# Patient Record
Sex: FEMALE | Race: WHITE | NOT HISPANIC OR LATINO | Employment: FULL TIME | URBAN - METROPOLITAN AREA
[De-identification: names, ages, dates, MRNs, and addresses within clinical notes are randomized per-mention and may not be internally consistent; named-entity substitution may affect disease eponyms.]

---

## 2023-04-27 PROBLEM — K21.9 GERD (GASTROESOPHAGEAL REFLUX DISEASE): Status: ACTIVE | Noted: 2023-04-27

## 2023-04-27 PROBLEM — F32.A DEPRESSION: Status: ACTIVE | Noted: 2023-04-27

## 2023-04-27 PROBLEM — M99.05 SOMATIC DYSFUNCTION OF PELVIC REGION: Status: ACTIVE | Noted: 2023-04-27

## 2023-04-27 PROBLEM — R13.10 DYSPHAGIA: Status: ACTIVE | Noted: 2023-04-27

## 2023-04-27 PROBLEM — F98.8 ATTENTION DEFICIT DISORDER (ADD): Status: ACTIVE | Noted: 2023-04-27

## 2023-04-27 PROBLEM — F31.9 BIPOLAR MOOD DISORDER (MULTI): Status: ACTIVE | Noted: 2023-04-27

## 2023-04-27 PROBLEM — R92.8 ABNORMAL MAMMOGRAM OF LEFT BREAST: Status: ACTIVE | Noted: 2023-04-27

## 2023-04-27 PROBLEM — F41.9 ANXIETY: Status: ACTIVE | Noted: 2023-04-27

## 2023-04-27 PROBLEM — M99.06 SOMATIC DYSFUNCTION OF LOWER EXTREMITIES: Status: ACTIVE | Noted: 2023-04-27

## 2023-04-27 PROBLEM — R73.09 ABNORMAL GLUCOSE: Status: ACTIVE | Noted: 2023-04-27

## 2023-04-27 PROBLEM — K59.00 CONSTIPATION: Status: ACTIVE | Noted: 2023-04-27

## 2023-04-27 PROBLEM — M99.03 SOMATIC DYSFUNCTION OF LUMBAR REGION: Status: ACTIVE | Noted: 2023-04-27

## 2023-04-27 PROBLEM — N12 PYELONEPHRITIS: Status: ACTIVE | Noted: 2023-04-27

## 2023-04-27 PROBLEM — D64.9 ANEMIA: Status: ACTIVE | Noted: 2023-04-27

## 2023-04-27 PROBLEM — H02.729 EYELASH SCANTINESS: Status: ACTIVE | Noted: 2023-04-27

## 2023-04-27 PROBLEM — M99.04 SOMATIC DYSFUNCTION OF SACRAL REGION: Status: ACTIVE | Noted: 2023-04-27

## 2023-04-27 PROBLEM — I10 BENIGN ESSENTIAL HYPERTENSION: Status: ACTIVE | Noted: 2023-04-27

## 2023-04-27 PROBLEM — R40.0 DAYTIME SLEEPINESS: Status: ACTIVE | Noted: 2023-04-27

## 2023-05-30 ENCOUNTER — OFFICE VISIT (OUTPATIENT)
Dept: PRIMARY CARE | Facility: CLINIC | Age: 68
End: 2023-05-30
Payer: MEDICARE

## 2023-05-30 VITALS
DIASTOLIC BLOOD PRESSURE: 70 MMHG | OXYGEN SATURATION: 95 % | HEIGHT: 61 IN | HEART RATE: 65 BPM | BODY MASS INDEX: 34.93 KG/M2 | SYSTOLIC BLOOD PRESSURE: 110 MMHG | WEIGHT: 185 LBS

## 2023-05-30 DIAGNOSIS — Z78.0 MENOPAUSE: ICD-10-CM

## 2023-05-30 DIAGNOSIS — Z79.899 HIGH RISK MEDICATION USE: ICD-10-CM

## 2023-05-30 DIAGNOSIS — M17.0 PRIMARY OSTEOARTHRITIS OF BOTH KNEES: ICD-10-CM

## 2023-05-30 DIAGNOSIS — F98.8 ATTENTION DEFICIT DISORDER (ADD) WITHOUT HYPERACTIVITY: ICD-10-CM

## 2023-05-30 DIAGNOSIS — F31.75 BIPOLAR DISORDER, IN PARTIAL REMISSION, MOST RECENT EPISODE DEPRESSED (MULTI): ICD-10-CM

## 2023-05-30 DIAGNOSIS — I10 BENIGN ESSENTIAL HYPERTENSION: ICD-10-CM

## 2023-05-30 DIAGNOSIS — E55.9 VITAMIN D INSUFFICIENCY: ICD-10-CM

## 2023-05-30 DIAGNOSIS — Z00.00 MEDICARE ANNUAL WELLNESS VISIT, SUBSEQUENT: Primary | ICD-10-CM

## 2023-05-30 DIAGNOSIS — K21.9 GASTROESOPHAGEAL REFLUX DISEASE WITHOUT ESOPHAGITIS: ICD-10-CM

## 2023-05-30 DIAGNOSIS — Z00.00 ROUTINE GENERAL MEDICAL EXAMINATION AT HEALTH CARE FACILITY: ICD-10-CM

## 2023-05-30 DIAGNOSIS — I10 PRIMARY HYPERTENSION: ICD-10-CM

## 2023-05-30 PROBLEM — R06.83 SNORING: Status: ACTIVE | Noted: 2023-05-30

## 2023-05-30 PROBLEM — M65.30 TRIGGER FINGER: Status: ACTIVE | Noted: 2023-05-30

## 2023-05-30 PROBLEM — Z86.79 HX OF PRIMARY HYPERTENSION: Status: ACTIVE | Noted: 2023-05-30

## 2023-05-30 PROBLEM — G47.33 OBSTRUCTIVE SLEEP APNEA: Status: ACTIVE | Noted: 2023-05-30

## 2023-05-30 PROBLEM — R31.9 HEMATURIA: Status: ACTIVE | Noted: 2023-05-30

## 2023-05-30 PROBLEM — M19.90 ARTHRITIS: Status: ACTIVE | Noted: 2023-05-30

## 2023-05-30 PROBLEM — M17.9 OSTEOARTHRITIS OF KNEE: Status: ACTIVE | Noted: 2023-05-30

## 2023-05-30 PROBLEM — E78.5 ELEVATED FASTING LIPID PROFILE: Status: ACTIVE | Noted: 2023-05-30

## 2023-05-30 PROBLEM — H90.3 ASYMMETRIC SNHL (SENSORINEURAL HEARING LOSS): Status: ACTIVE | Noted: 2023-05-30

## 2023-05-30 PROBLEM — L90.0 LICHEN SCLEROSUS: Status: ACTIVE | Noted: 2023-05-30

## 2023-05-30 PROBLEM — M25.559 HIP PAIN: Status: ACTIVE | Noted: 2023-05-30

## 2023-05-30 PROBLEM — J31.0 CHRONIC RHINITIS: Status: ACTIVE | Noted: 2023-05-30

## 2023-05-30 PROBLEM — E78.5 HYPERLIPEMIA: Status: ACTIVE | Noted: 2023-05-30

## 2023-05-30 PROCEDURE — 1160F RVW MEDS BY RX/DR IN RCRD: CPT | Performed by: FAMILY MEDICINE

## 2023-05-30 PROCEDURE — 99214 OFFICE O/P EST MOD 30 MIN: CPT | Performed by: FAMILY MEDICINE

## 2023-05-30 PROCEDURE — 1036F TOBACCO NON-USER: CPT | Performed by: FAMILY MEDICINE

## 2023-05-30 PROCEDURE — 1170F FXNL STATUS ASSESSED: CPT | Performed by: FAMILY MEDICINE

## 2023-05-30 PROCEDURE — G0439 PPPS, SUBSEQ VISIT: HCPCS | Performed by: FAMILY MEDICINE

## 2023-05-30 PROCEDURE — 1159F MED LIST DOCD IN RCRD: CPT | Performed by: FAMILY MEDICINE

## 2023-05-30 PROCEDURE — 3078F DIAST BP <80 MM HG: CPT | Performed by: FAMILY MEDICINE

## 2023-05-30 PROCEDURE — 3074F SYST BP LT 130 MM HG: CPT | Performed by: FAMILY MEDICINE

## 2023-05-30 RX ORDER — MELOXICAM 15 MG/1
15 TABLET ORAL DAILY
Qty: 90 TABLET | Refills: 1 | Status: SHIPPED | OUTPATIENT
Start: 2023-05-30 | End: 2023-05-30 | Stop reason: SDUPTHER

## 2023-05-30 RX ORDER — HYDROCHLOROTHIAZIDE 25 MG/1
25 TABLET ORAL DAILY
Qty: 90 TABLET | Refills: 1 | Status: SHIPPED | OUTPATIENT
Start: 2023-05-30 | End: 2023-11-28 | Stop reason: SDUPTHER

## 2023-05-30 RX ORDER — PANTOPRAZOLE SODIUM 40 MG/1
40 TABLET, DELAYED RELEASE ORAL
Qty: 90 TABLET | Refills: 1 | Status: SHIPPED | OUTPATIENT
Start: 2023-05-30 | End: 2023-11-28 | Stop reason: SDUPTHER

## 2023-05-30 RX ORDER — LISINOPRIL 20 MG/1
20 TABLET ORAL DAILY
Qty: 90 TABLET | Refills: 1 | Status: SHIPPED | OUTPATIENT
Start: 2023-05-30 | End: 2023-05-30 | Stop reason: SDUPTHER

## 2023-05-30 RX ORDER — DEXTROAMPHETAMINE SACCHARATE, AMPHETAMINE ASPARTATE, DEXTROAMPHETAMINE SULFATE AND AMPHETAMINE SULFATE 7.5; 7.5; 7.5; 7.5 MG/1; MG/1; MG/1; MG/1
1 TABLET ORAL 2 TIMES DAILY
Qty: 60 TABLET | Refills: 0 | Status: SHIPPED | OUTPATIENT
Start: 2023-05-30 | End: 2023-11-28 | Stop reason: DRUGHIGH

## 2023-05-30 RX ORDER — MELOXICAM 15 MG/1
15 TABLET ORAL DAILY
Qty: 90 TABLET | Refills: 1 | Status: SHIPPED
Start: 2023-05-30 | End: 2023-11-28 | Stop reason: ALTCHOICE

## 2023-05-30 RX ORDER — LISINOPRIL 20 MG/1
20 TABLET ORAL DAILY
Qty: 90 TABLET | Refills: 1 | Status: SHIPPED | OUTPATIENT
Start: 2023-05-30 | End: 2023-11-28 | Stop reason: SDUPTHER

## 2023-05-30 RX ORDER — HYDROCHLOROTHIAZIDE 25 MG/1
25 TABLET ORAL DAILY
Qty: 90 TABLET | Refills: 1 | Status: SHIPPED | OUTPATIENT
Start: 2023-05-30 | End: 2023-05-30 | Stop reason: SDUPTHER

## 2023-05-30 RX ORDER — DEXTROAMPHETAMINE SACCHARATE, AMPHETAMINE ASPARTATE, DEXTROAMPHETAMINE SULFATE AND AMPHETAMINE SULFATE 7.5; 7.5; 7.5; 7.5 MG/1; MG/1; MG/1; MG/1
1 TABLET ORAL 2 TIMES DAILY
Qty: 60 TABLET | Refills: 0 | Status: SHIPPED | OUTPATIENT
Start: 2023-05-30 | End: 2023-05-30 | Stop reason: SDUPTHER

## 2023-05-30 RX ORDER — PANTOPRAZOLE SODIUM 40 MG/1
40 TABLET, DELAYED RELEASE ORAL
Qty: 90 TABLET | Refills: 1 | Status: SHIPPED | OUTPATIENT
Start: 2023-05-30 | End: 2023-05-30 | Stop reason: SDUPTHER

## 2023-05-30 ASSESSMENT — ACTIVITIES OF DAILY LIVING (ADL)
TOILETING: INDEPENDENT
GROOMING: INDEPENDENT
FEEDING YOURSELF: INDEPENDENT
PATIENT'S MEMORY ADEQUATE TO SAFELY COMPLETE DAILY ACTIVITIES?: YES
HEARING - LEFT EAR: FUNCTIONAL
WALKS IN HOME: INDEPENDENT
BATHING: INDEPENDENT
TAKING_MEDICATION: INDEPENDENT
DRESSING YOURSELF: INDEPENDENT
ADEQUATE_TO_COMPLETE_ADL: YES
BATHING: INDEPENDENT
MANAGING_FINANCES: INDEPENDENT
JUDGMENT_ADEQUATE_SAFELY_COMPLETE_DAILY_ACTIVITIES: YES
HEARING - RIGHT EAR: FUNCTIONAL
DRESSING: INDEPENDENT
DOING_HOUSEWORK: INDEPENDENT
GROCERY_SHOPPING: INDEPENDENT

## 2023-05-30 ASSESSMENT — ANXIETY QUESTIONNAIRES
6. BECOMING EASILY ANNOYED OR IRRITABLE: SEVERAL DAYS
7. FEELING AFRAID AS IF SOMETHING AWFUL MIGHT HAPPEN: NOT AT ALL
3. WORRYING TOO MUCH ABOUT DIFFERENT THINGS: NOT AT ALL
IF YOU CHECKED OFF ANY PROBLEMS ON THIS QUESTIONNAIRE, HOW DIFFICULT HAVE THESE PROBLEMS MADE IT FOR YOU TO DO YOUR WORK, TAKE CARE OF THINGS AT HOME, OR GET ALONG WITH OTHER PEOPLE: NOT DIFFICULT AT ALL
4. TROUBLE RELAXING: NOT AT ALL
2. NOT BEING ABLE TO STOP OR CONTROL WORRYING: NOT AT ALL
1. FEELING NERVOUS, ANXIOUS, OR ON EDGE: NOT AT ALL
5. BEING SO RESTLESS THAT IT IS HARD TO SIT STILL: NOT AT ALL
GAD7 TOTAL SCORE: 1

## 2023-05-30 ASSESSMENT — PATIENT HEALTH QUESTIONNAIRE - PHQ9
5. POOR APPETITE OR OVEREATING: NOT AT ALL
9. THOUGHTS THAT YOU WOULD BE BETTER OFF DEAD, OR OF HURTING YOURSELF: NOT AT ALL
2. FEELING DOWN, DEPRESSED OR HOPELESS: NOT AT ALL
6. FEELING BAD ABOUT YOURSELF - OR THAT YOU ARE A FAILURE OR HAVE LET YOURSELF OR YOUR FAMILY DOWN: NOT AT ALL
SUM OF ALL RESPONSES TO PHQ QUESTIONS 1-9: 3
8. MOVING OR SPEAKING SO SLOWLY THAT OTHER PEOPLE COULD HAVE NOTICED. OR THE OPPOSITE, BEING SO FIGETY OR RESTLESS THAT YOU HAVE BEEN MOVING AROUND A LOT MORE THAN USUAL: NOT AT ALL
1. LITTLE INTEREST OR PLEASURE IN DOING THINGS: SEVERAL DAYS
7. TROUBLE CONCENTRATING ON THINGS, SUCH AS READING THE NEWSPAPER OR WATCHING TELEVISION: NOT AT ALL
10. IF YOU CHECKED OFF ANY PROBLEMS, HOW DIFFICULT HAVE THESE PROBLEMS MADE IT FOR YOU TO DO YOUR WORK, TAKE CARE OF THINGS AT HOME, OR GET ALONG WITH OTHER PEOPLE: NOT DIFFICULT AT ALL
3. TROUBLE FALLING OR STAYING ASLEEP OR SLEEPING TOO MUCH: SEVERAL DAYS
SUM OF ALL RESPONSES TO PHQ9 QUESTIONS 1 AND 2: 1
4. FEELING TIRED OR HAVING LITTLE ENERGY: SEVERAL DAYS

## 2023-05-30 ASSESSMENT — ENCOUNTER SYMPTOMS
LOSS OF SENSATION IN FEET: 0
DEPRESSION: 0
OCCASIONAL FEELINGS OF UNSTEADINESS: 0

## 2023-05-30 NOTE — PROGRESS NOTES
Subjective   Reason for Visit: Elsy Molina is an 67 y.o. female here for a Medicare Wellness visit.     Past Medical, Surgical, and Family History reviewed and updated in chart.    Reviewed all medications by prescribing practitioner or clinical pharmacist (such as prescriptions, OTCs, herbal therapies and supplements) and documented in the medical record.    POA/Living will: [facility]er [facility]    OARRS:  Jennifer Gama, DO on 5/30/2023  1:57 PM  I have personally reviewed the OARRS report for Elsy Molina. I have considered the risks of abuse, dependence, addiction and diversion    Is the patient prescribed a combination of a benzodiazepine and opioid?  No    Last Urine Drug Screen / ordered today: Yes  Recent Results (from the past 62426 hour(s))   Amphetamine Confirm, Urine    Collection Time: 02/18/22 12:55 PM   Result Value Ref Range    Amphetamines,Urine <50 ng/mL    MDA, Urine <200 ng/mL    MDEA, Urine <200 ng/mL    MDMA, Urine <200 ng/mL    Methamphetamine Quant, Ur <200 ng/mL    Phentermine,Urine <200 ng/mL   Drug Screen, Urine With Reflex to Confirmation    Collection Time: 02/18/22 12:55 PM   Result Value Ref Range    DRUG SCREEN COMMENT URINE SEE BELOW     Amphetamine Screen, Urine PRESUMPTIVE NEGATIVE NEGATIVE    Barbiturate Screen, Urine PRESUMPTIVE NEGATIVE NEGATIVE    BENZODIAZEPINE (PRESENCE) IN URINE BY SCREEN METHOD PRESUMPTIVE NEGATIVE NEGATIVE    Cannabinoid Screen, Urine PRESUMPTIVE NEGATIVE NEGATIVE    Cocaine Screen, Urine PRESUMPTIVE NEGATIVE NEGATIVE    Fentanyl, Ur PRESUMPTIVE NEGATIVE NEGATIVE    Methadone Screen, Urine PRESUMPTIVE NEGATIVE NEGATIVE    Opiate Screen, Urine PRESUMPTIVE NEGATIVE NEGATIVE    Oxycodone Screen, Ur PRESUMPTIVE NEGATIVE NEGATIVE    PCP Screen, Urine PRESUMPTIVE NEGATIVE NEGATIVE     Results are as expected.     Controlled Substance Agreement:  Date of the Last Agreement: 5/30/2023  Reviewed Controlled Substance Agreement including but not limited to the  "benefits, risks, and alternatives to treatment with a Controlled Substance medication(s).    Stimulants:   What is the patient's goal of therapy? Maintain focus for daily activities  Is this being achieved with current treatment? yes    Activities of Daily Living:   Is your overall impression that this patient is benefiting (symptom reduction outweighs side effects) from stimulant therapy? Yes     1. Physical Functioning: Same  2. Family Relationship: Same  3. Social Relationship: Same  4. Mood: Same  5. Sleep Patterns: Same  6. Overall Function: Same    SOC Hx: not   Tobacco Use: Low Risk  (5/30/2023)    Patient History     Smoking Tobacco Use: Never     Smokeless Tobacco Use: Never     Passive Exposure: Not on file     Alcohol Use: Not on file       DIET: general    EXERCISE: The patient does not participate in regular exercise at present.    ELIMINATION: no constipation or diarrhea    No urinary issuesnone    SLEEP: no issues normal    MOODS:   PHQ-9: Patient Health Questionnaire-9 Score: 3    BONNY-7: BONNY-7 Total Score: 1     OB/GYN: LMP: No LMP recorded.  Menopause at 32 years  Last pap date: n/a  Abnormal pap? no     PREVENTATIVE CARE:  PAP: 5/2018, normal HPV negative - followed by Dr. Mignon Aguila  MAMMO: 6/16/2021 - normal  DEXA: 6/16/2021 - normal  Colonoscopy: 9/17/2021 - serrated TA on excision, Dr Valdes (repeat 2024?)    VACCINES:   TDAP: 8/24/2015  FLU:10/1/2020  Zoster: 8/24/2015   Prevnar 13: 8/17/2017  COVID: 1/6/2021 + 2/3/2021    Patient Care Team:  Jennifer Gama DO as PCP - General  Beverley Klein MD as PCP - [employer] PCP     Review of Systems  Review of Systems negative except as noted in HPI and Chief complaint.     Objective   Vitals:  /70 (BP Location: Right arm, Patient Position: Sitting, BP Cuff Size: Adult)   Pulse 65   Ht 1.549 m (5' 1\")   Wt 83.9 kg (185 lb)   SpO2 95%   BMI 34.96 kg/m²       Physical Exam  Constitutional:       General: She is not in " acute distress.     Appearance: Normal appearance. She is not ill-appearing.   HENT:      Head: Normocephalic and atraumatic.   Neck:      Vascular: No carotid bruit.   Cardiovascular:      Rate and Rhythm: Normal rate and regular rhythm.      Pulses: Normal pulses.      Heart sounds: Murmur heard.      Systolic murmur is present with a grade of 3/6.      No gallop.   Pulmonary:      Effort: Pulmonary effort is normal.      Breath sounds: Normal breath sounds. No wheezing, rhonchi or rales.   Musculoskeletal:      Cervical back: Normal range of motion and neck supple. No rigidity or tenderness.   Lymphadenopathy:      Cervical: No cervical adenopathy.   Skin:     General: Skin is warm and dry.   Neurological:      Mental Status: She is alert.   Psychiatric:         Mood and Affect: Mood normal.         Behavior: Behavior normal.         Assessment/Plan   Problem List Items Addressed This Visit          Circulatory    Benign essential hypertension    Relevant Medications    hydroCHLOROthiazide (HYDRODiuril) 25 mg tablet    lisinopril 20 mg tablet       Digestive    GERD (gastroesophageal reflux disease)    Relevant Medications    pantoprazole (ProtoNix) 40 mg EC tablet       Musculoskeletal    Osteoarthritis of knee    Relevant Medications    meloxicam (Mobic) 15 mg tablet       Other    Attention deficit disorder (ADD)    Relevant Medications    amphetamine-dextroamphetamine (Adderall) 30 mg tablet    Medicare annual wellness visit, subsequent - Primary     Other Visit Diagnoses       Routine general medical examination at health care facility        Relevant Orders    CBC    Comprehensive Metabolic Panel    Lipid Panel    TSH with reflex to Free T4 if abnormal    High risk medication use        Relevant Orders    Amphetamine Confirm, Urine    Drug Screen, Urine With Reflex to Confirmation    Menopause        Relevant Orders    XR DEXA bone density    Vitamin D insufficiency        Relevant Orders    Vitamin D,  Total    Hx of primary hypertension        Relevant Orders    CBC          Follow up 6 months for CPE.

## 2023-05-30 NOTE — ASSESSMENT & PLAN NOTE
Refilling ADD medications at current doses.  Reviewed policy for controlled substances - you are not to fill early or request refills early.  Any variance from policy could results in discontinuation of therapy and discharge from our care.  See signed Controlled Substance Contract forms which are scanned.  OARRS report reviewed and no suspicious activity noted.  Follow up visits every 3-6 months at minimum.

## 2023-05-30 NOTE — ASSESSMENT & PLAN NOTE
Stable - decreased motivation noted this visit.  Defer management to Psychiatry.  Call if symptoms worsen or change.

## 2023-05-30 NOTE — PATIENT INSTRUCTIONS
Please have labs drawn at your earliest convenience.  You should fast 12 hours prior to arriving at the lab - during these 12 hours you may have water, black coffee, black tea - nothing with cream or sugar and no solid foods.    Our office will contact you with results after they have been reviewed.  Please allow 48 - 72 hours for most results, some may take longer.    Please keep in mind that results may post immediately to your online portal, even before we have a chance to review them.  Once we have had the opportunity to review results, we will post comments and have our staff contact you with these results and any further instructions or recommendations.      Please call our office if you do not hear from our office in 7 days.     Follow up 6 months for CPE and medication refills.

## 2023-06-03 ENCOUNTER — LAB (OUTPATIENT)
Dept: LAB | Facility: LAB | Age: 68
End: 2023-06-03
Payer: MEDICARE

## 2023-06-03 DIAGNOSIS — Z00.00 ROUTINE GENERAL MEDICAL EXAMINATION AT HEALTH CARE FACILITY: ICD-10-CM

## 2023-06-03 DIAGNOSIS — I10 PRIMARY HYPERTENSION: ICD-10-CM

## 2023-06-03 DIAGNOSIS — E55.9 VITAMIN D INSUFFICIENCY: ICD-10-CM

## 2023-06-03 LAB
ALANINE AMINOTRANSFERASE (SGPT) (U/L) IN SER/PLAS: 16 U/L (ref 7–45)
ALBUMIN (G/DL) IN SER/PLAS: 4.4 G/DL (ref 3.4–5)
ALKALINE PHOSPHATASE (U/L) IN SER/PLAS: 78 U/L (ref 33–136)
ANION GAP IN SER/PLAS: 14 MMOL/L (ref 10–20)
ASPARTATE AMINOTRANSFERASE (SGOT) (U/L) IN SER/PLAS: 15 U/L (ref 9–39)
BILIRUBIN TOTAL (MG/DL) IN SER/PLAS: 0.6 MG/DL (ref 0–1.2)
CALCIDIOL (25 OH VITAMIN D3) (NG/ML) IN SER/PLAS: 70 NG/ML
CALCIUM (MG/DL) IN SER/PLAS: 10.1 MG/DL (ref 8.6–10.6)
CARBON DIOXIDE, TOTAL (MMOL/L) IN SER/PLAS: 28 MMOL/L (ref 21–32)
CHLORIDE (MMOL/L) IN SER/PLAS: 103 MMOL/L (ref 98–107)
CHOLESTEROL (MG/DL) IN SER/PLAS: 195 MG/DL (ref 0–199)
CHOLESTEROL IN HDL (MG/DL) IN SER/PLAS: 56.2 MG/DL
CHOLESTEROL/HDL RATIO: 3.5
CREATININE (MG/DL) IN SER/PLAS: 1.2 MG/DL (ref 0.5–1.05)
ERYTHROCYTE DISTRIBUTION WIDTH (RATIO) BY AUTOMATED COUNT: 13 % (ref 11.5–14.5)
ERYTHROCYTE MEAN CORPUSCULAR HEMOGLOBIN CONCENTRATION (G/DL) BY AUTOMATED: 32.3 G/DL (ref 32–36)
ERYTHROCYTE MEAN CORPUSCULAR VOLUME (FL) BY AUTOMATED COUNT: 92 FL (ref 80–100)
ERYTHROCYTES (10*6/UL) IN BLOOD BY AUTOMATED COUNT: 3.87 X10E12/L (ref 4–5.2)
GFR FEMALE: 49 ML/MIN/1.73M2
GLUCOSE (MG/DL) IN SER/PLAS: 92 MG/DL (ref 74–99)
HEMATOCRIT (%) IN BLOOD BY AUTOMATED COUNT: 35.6 % (ref 36–46)
HEMOGLOBIN (G/DL) IN BLOOD: 11.5 G/DL (ref 12–16)
LDL: 112 MG/DL (ref 0–99)
LEUKOCYTES (10*3/UL) IN BLOOD BY AUTOMATED COUNT: 6 X10E9/L (ref 4.4–11.3)
NRBC (PER 100 WBCS) BY AUTOMATED COUNT: 0 /100 WBC (ref 0–0)
PLATELETS (10*3/UL) IN BLOOD AUTOMATED COUNT: 296 X10E9/L (ref 150–450)
POTASSIUM (MMOL/L) IN SER/PLAS: 4.9 MMOL/L (ref 3.5–5.3)
PROTEIN TOTAL: 7.2 G/DL (ref 6.4–8.2)
SODIUM (MMOL/L) IN SER/PLAS: 140 MMOL/L (ref 136–145)
THYROTROPIN (MIU/L) IN SER/PLAS BY DETECTION LIMIT <= 0.05 MIU/L: 1.9 MIU/L (ref 0.44–3.98)
TRIGLYCERIDE (MG/DL) IN SER/PLAS: 132 MG/DL (ref 0–149)
UREA NITROGEN (MG/DL) IN SER/PLAS: 25 MG/DL (ref 6–23)
VLDL: 26 MG/DL (ref 0–40)

## 2023-06-03 PROCEDURE — 85027 COMPLETE CBC AUTOMATED: CPT

## 2023-06-03 PROCEDURE — 36415 COLL VENOUS BLD VENIPUNCTURE: CPT

## 2023-06-03 PROCEDURE — 80061 LIPID PANEL: CPT

## 2023-06-03 PROCEDURE — 84443 ASSAY THYROID STIM HORMONE: CPT

## 2023-06-03 PROCEDURE — 80053 COMPREHEN METABOLIC PANEL: CPT

## 2023-06-03 PROCEDURE — 82306 VITAMIN D 25 HYDROXY: CPT

## 2023-06-08 DIAGNOSIS — N28.9 DECREASED RENAL FUNCTION: ICD-10-CM

## 2023-06-08 DIAGNOSIS — I10 BENIGN ESSENTIAL HYPERTENSION: Primary | ICD-10-CM

## 2023-06-08 DIAGNOSIS — E78.2 MIXED HYPERLIPIDEMIA: ICD-10-CM

## 2023-06-08 DIAGNOSIS — D50.8 OTHER IRON DEFICIENCY ANEMIA: ICD-10-CM

## 2023-07-19 ENCOUNTER — OFFICE VISIT (OUTPATIENT)
Dept: PRIMARY CARE | Facility: CLINIC | Age: 68
End: 2023-07-19
Payer: MEDICARE

## 2023-07-19 ENCOUNTER — LAB (OUTPATIENT)
Dept: LAB | Facility: LAB | Age: 68
End: 2023-07-19
Payer: MEDICARE

## 2023-07-19 ENCOUNTER — TELEPHONE (OUTPATIENT)
Dept: PRIMARY CARE | Facility: CLINIC | Age: 68
End: 2023-07-19

## 2023-07-19 VITALS
BODY MASS INDEX: 35.22 KG/M2 | TEMPERATURE: 98.2 F | WEIGHT: 186.4 LBS | OXYGEN SATURATION: 96 % | DIASTOLIC BLOOD PRESSURE: 78 MMHG | HEART RATE: 71 BPM | RESPIRATION RATE: 16 BRPM | SYSTOLIC BLOOD PRESSURE: 130 MMHG

## 2023-07-19 DIAGNOSIS — R10.9 RIGHT FLANK PAIN: Primary | ICD-10-CM

## 2023-07-19 DIAGNOSIS — D50.8 IRON DEFICIENCY ANEMIA SECONDARY TO INADEQUATE DIETARY IRON INTAKE: ICD-10-CM

## 2023-07-19 DIAGNOSIS — R10.9 RIGHT FLANK PAIN: ICD-10-CM

## 2023-07-19 DIAGNOSIS — N30.01 ACUTE CYSTITIS WITH HEMATURIA: ICD-10-CM

## 2023-07-19 LAB
ANION GAP IN SER/PLAS: 13 MMOL/L (ref 10–20)
BASOPHILS (10*3/UL) IN BLOOD BY AUTOMATED COUNT: 0.07 X10E9/L (ref 0–0.1)
BASOPHILS/100 LEUKOCYTES IN BLOOD BY AUTOMATED COUNT: 0.9 % (ref 0–2)
CALCIUM (MG/DL) IN SER/PLAS: 10.1 MG/DL (ref 8.6–10.6)
CARBON DIOXIDE, TOTAL (MMOL/L) IN SER/PLAS: 30 MMOL/L (ref 21–32)
CHLORIDE (MMOL/L) IN SER/PLAS: 104 MMOL/L (ref 98–107)
CREATININE (MG/DL) IN SER/PLAS: 1.27 MG/DL (ref 0.5–1.05)
EOSINOPHILS (10*3/UL) IN BLOOD BY AUTOMATED COUNT: 0.41 X10E9/L (ref 0–0.7)
EOSINOPHILS/100 LEUKOCYTES IN BLOOD BY AUTOMATED COUNT: 5.2 % (ref 0–6)
ERYTHROCYTE DISTRIBUTION WIDTH (RATIO) BY AUTOMATED COUNT: 13.8 % (ref 11.5–14.5)
ERYTHROCYTE MEAN CORPUSCULAR HEMOGLOBIN CONCENTRATION (G/DL) BY AUTOMATED: 32.7 G/DL (ref 32–36)
ERYTHROCYTE MEAN CORPUSCULAR VOLUME (FL) BY AUTOMATED COUNT: 94 FL (ref 80–100)
ERYTHROCYTES (10*6/UL) IN BLOOD BY AUTOMATED COUNT: 3.74 X10E12/L (ref 4–5.2)
GFR FEMALE: 46 ML/MIN/1.73M2
GLUCOSE (MG/DL) IN SER/PLAS: 89 MG/DL (ref 74–99)
HEMATOCRIT (%) IN BLOOD BY AUTOMATED COUNT: 35.2 % (ref 36–46)
HEMOGLOBIN (G/DL) IN BLOOD: 11.5 G/DL (ref 12–16)
IMMATURE GRANULOCYTES/100 LEUKOCYTES IN BLOOD BY AUTOMATED COUNT: 0.3 % (ref 0–0.9)
LEUKOCYTES (10*3/UL) IN BLOOD BY AUTOMATED COUNT: 7.9 X10E9/L (ref 4.4–11.3)
LYMPHOCYTES (10*3/UL) IN BLOOD BY AUTOMATED COUNT: 2.37 X10E9/L (ref 1.2–4.8)
LYMPHOCYTES/100 LEUKOCYTES IN BLOOD BY AUTOMATED COUNT: 30 % (ref 13–44)
MONOCYTES (10*3/UL) IN BLOOD BY AUTOMATED COUNT: 0.77 X10E9/L (ref 0.1–1)
MONOCYTES/100 LEUKOCYTES IN BLOOD BY AUTOMATED COUNT: 9.7 % (ref 2–10)
NEUTROPHILS (10*3/UL) IN BLOOD BY AUTOMATED COUNT: 4.26 X10E9/L (ref 1.2–7.7)
NEUTROPHILS/100 LEUKOCYTES IN BLOOD BY AUTOMATED COUNT: 53.9 % (ref 40–80)
NRBC (PER 100 WBCS) BY AUTOMATED COUNT: 0 /100 WBC (ref 0–0)
PLATELETS (10*3/UL) IN BLOOD AUTOMATED COUNT: 298 X10E9/L (ref 150–450)
POC APPEARANCE, URINE: CLEAR
POC BILIRUBIN, URINE: NEGATIVE
POC BLOOD, URINE: ABNORMAL
POC COLOR, URINE: YELLOW
POC GLUCOSE, URINE: NEGATIVE MG/DL
POC KETONES, URINE: NEGATIVE MG/DL
POC LEUKOCYTES, URINE: NEGATIVE
POC NITRITE,URINE: NEGATIVE
POC PH, URINE: 6 PH
POC PROTEIN, URINE: NEGATIVE MG/DL
POC SPECIFIC GRAVITY, URINE: 1.01
POC UROBILINOGEN, URINE: 0.2 EU/DL
POTASSIUM (MMOL/L) IN SER/PLAS: 4.9 MMOL/L (ref 3.5–5.3)
SODIUM (MMOL/L) IN SER/PLAS: 142 MMOL/L (ref 136–145)
UREA NITROGEN (MG/DL) IN SER/PLAS: 29 MG/DL (ref 6–23)

## 2023-07-19 PROCEDURE — 3078F DIAST BP <80 MM HG: CPT | Performed by: FAMILY MEDICINE

## 2023-07-19 PROCEDURE — 85025 COMPLETE CBC W/AUTO DIFF WBC: CPT

## 2023-07-19 PROCEDURE — 80048 BASIC METABOLIC PNL TOTAL CA: CPT

## 2023-07-19 PROCEDURE — 87086 URINE CULTURE/COLONY COUNT: CPT

## 2023-07-19 PROCEDURE — 1159F MED LIST DOCD IN RCRD: CPT | Performed by: FAMILY MEDICINE

## 2023-07-19 PROCEDURE — 1160F RVW MEDS BY RX/DR IN RCRD: CPT | Performed by: FAMILY MEDICINE

## 2023-07-19 PROCEDURE — 3075F SYST BP GE 130 - 139MM HG: CPT | Performed by: FAMILY MEDICINE

## 2023-07-19 PROCEDURE — 99213 OFFICE O/P EST LOW 20 MIN: CPT | Performed by: FAMILY MEDICINE

## 2023-07-19 PROCEDURE — 1125F AMNT PAIN NOTED PAIN PRSNT: CPT | Performed by: FAMILY MEDICINE

## 2023-07-19 PROCEDURE — 36415 COLL VENOUS BLD VENIPUNCTURE: CPT

## 2023-07-19 PROCEDURE — 81003 URINALYSIS AUTO W/O SCOPE: CPT | Performed by: FAMILY MEDICINE

## 2023-07-19 PROCEDURE — 1036F TOBACCO NON-USER: CPT | Performed by: FAMILY MEDICINE

## 2023-07-19 NOTE — TELEPHONE ENCOUNTER
Patient was in the office, wanted to go over her LDL levels from her last draw, please call patient.

## 2023-07-19 NOTE — PROGRESS NOTES
Subjective   Patient ID: Elsy Molina is a 68 y.o. female who presents for UTI (X 1 mth with lower RT abdominal pain).    Patient of Dr. Gama here with complaints of possible kidney stone    Admits to right lower quadrant abdominal pain off and on for the past month.  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  Denies urine symptoms.  Pain comes and goes.  She is pain-free today.  Has been drinking increase fluids.    Labs completed in June showing anemia, renal insufficiency and hyperlipidemia.    UTI          Review of Systems    Objective   /78   Pulse 71   Temp 36.8 °C (98.2 °F)   Resp 16   Wt 84.6 kg (186 lb 6.4 oz)   SpO2 96%   BMI 35.22 kg/m²     Physical Exam  Abdominal:      General: Abdomen is protuberant.      Palpations: Abdomen is soft.      Tenderness: There is right CVA tenderness.         Assessment/Plan   Problem List Items Addressed This Visit       Anemia    Relevant Orders    CBC and Auto Differential     Other Visit Diagnoses       Right flank pain    -  Primary    Possible renal lithiasis  Checking stat CT abdomen and pelvis.  Checking stat BMP and treat accordingly    Relevant Orders    CT abdomen pelvis wo IV contrast    Basic Metabolic Panel    Acute cystitis with hematuria        Relevant Orders    POCT UA Automated manually resulted (Completed)

## 2023-07-21 LAB — URINE CULTURE: NORMAL

## 2023-08-24 ENCOUNTER — APPOINTMENT (OUTPATIENT)
Dept: PRIMARY CARE | Facility: CLINIC | Age: 68
End: 2023-08-24
Payer: MEDICARE

## 2023-10-13 ENCOUNTER — TELEPHONE (OUTPATIENT)
Dept: OTHER | Age: 68
End: 2023-10-13
Payer: MEDICARE

## 2023-10-16 ENCOUNTER — TELEMEDICINE (OUTPATIENT)
Dept: BEHAVIORAL HEALTH | Facility: CLINIC | Age: 68
End: 2023-10-16
Payer: MEDICARE

## 2023-10-16 DIAGNOSIS — F41.9 ANXIETY: ICD-10-CM

## 2023-10-16 DIAGNOSIS — F90.0 ATTENTION DEFICIT HYPERACTIVITY DISORDER (ADHD), PREDOMINANTLY INATTENTIVE TYPE: ICD-10-CM

## 2023-10-16 DIAGNOSIS — F31.70 BIPOLAR AFFECTIVE DISORDER IN REMISSION (MULTI): ICD-10-CM

## 2023-10-16 PROCEDURE — 99214 OFFICE O/P EST MOD 30 MIN: CPT | Performed by: PSYCHIATRY & NEUROLOGY

## 2023-10-16 RX ORDER — PAROXETINE 30 MG/1
15 TABLET, FILM COATED ORAL DAILY
Qty: 15 TABLET | Refills: 1 | Status: SHIPPED | OUTPATIENT
Start: 2023-10-16 | End: 2024-01-29 | Stop reason: SDUPTHER

## 2023-10-16 RX ORDER — LAMOTRIGINE 150 MG/1
300 TABLET ORAL DAILY
Qty: 180 TABLET | Refills: 0 | Status: SHIPPED | OUTPATIENT
Start: 2023-10-16 | End: 2024-01-29 | Stop reason: SDUPTHER

## 2023-10-16 NOTE — PATIENT INSTRUCTIONS
-increase paxil to 15 mg daily, continue lamictal 300 mg daily   -I will see you in about one month     Safety plan reviewed with patient. If there are any thoughts of harming your self, harming others, concerning worsening of your mental health symptoms or concerning medication side effects, please call [phone] or reports to the nearest emergency room.   Reviewed r/b/a of medications, side effects reviewed, patient gives informed consent for each medication. Patient understands to report to the er/urgent care if there are concerning medication side effects.

## 2023-10-16 NOTE — PROGRESS NOTES
Outpatient Psychiatry          Reason for Visit: routine follow up for Bipolar II versus I; ADHD; anxiety unspecified . She has been more anxious since move summer 2023 which as resulted in fatigue and decreased interest.          HPI:67 y/o female who presented 9/20/2021 for management of ADHD and bipolar II. She was very anxious in the setting of purchasing a condo over the summer. She continues to feel anxious , but decreasing over time as she adjusts to this change. Notes she has been less social over the past couple of months, feeling like she would rather be by herself. She is pushing herself to not isolates and to make sure she is around others. Relates these symptoms more to anxiety than depression, she has felt overwhelmed with move and tasks she has not completed which makes her feel stressed and fatigued.   She continues to take adderall per pcp and focus continues to be acceptable. Paxil has been helpful with anxiety - no longer feeling anxious when she talks to people, more relaxed in social situations.   There has been no asia.   No death wish or SI.   States she does not have much energy/procrastination continues to work full time.   Sleep and appetite have been good, however she often stays up too late.  no new health issues aside from cataract surgery 9/2023.   She recalls depression beginning by her teen years. Depression is chronic and never seems to fully resolve. Depressive exacerbations consist of tearfulness, lacks motivation/no energy, isolates, cancels appointments, isolates, irritable, increased sleep, lacks self confidence . There has been no death wish or SI for years. No hallucinations, no paranoia. Elevated moods have consisted of impulsive behavior (resulted in divorce, met with men she knew nothing about), spends a great deal of money, excited, goes off on tangents when speaking, starting many projects, hyper, - she recalls three episodes of highly impulsive behavior/out of character  that lasted for a few months.   Denies excessive chronic daily worry but can be ruminative, no panic attack, no compulsions.   She has been dx with ADHD by some sort of testing. She recalls being easily frustrated during school, but does not recall having specific difficulties paying attention during school. She thinks Adderall helps her focus , stay on task. . The last adderall rx was 2/2021. historically is has been hard to concentrate at work as she thinks of other things prior to finishing what she is doing.       PAST PSYCH H/O: bipolar II dx 4 years ago, dx ADHD dx around 2014. First mental health treatment by pcp with anti depressants around her mid 30's and she has remained on meds since that time. She has been in counseling in the past.   PAST PSYCH HOSP: denied  SELF HARM: none , but had SI in her 30's and 40's  PAST PSYCH MEDS: adderall, many antidepressants- cymbalta, prozac, paxil, effexor, wellbutrin, lexapro, zoloft. lamcital,     SUBSTANCE USE H/O: no h/o tobacco/nicotine, drinks wine 1-2 times per week - about 1 glass, denies drug use    FAMILY PSYCH H/O: sisters- depression, mom - depression, niece- suicided 1 year ago (26 y/o)     PAST MED H/O: OA, GERD   ALL: NKDA       SOCIAL H/O: lives alone,  X 1, 3 grown children who she is. close with , close with her 2 sisters. 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 denies victimization h/o        Current Medications:    Current Outpatient Medications:     amphetamine-dextroamphetamine (Adderall) 30 mg tablet, Take 1 tablet (30 mg) by mouth 2 times a day., Disp: 60 tablet, Rfl: 0    hydroCHLOROthiazide (HYDRODiuril) 25 mg tablet, Take 1 tablet (25 mg) by mouth once daily., Disp: 90 tablet, Rfl: 1    lamoTRIgine (LaMICtal) 150 mg tablet, Take 2 tablets (300 mg) by mouth once daily., Disp: , Rfl:     lisinopril 20 mg tablet, Take 1 tablet (20 mg) by mouth once  daily., Disp: 90 tablet, Rfl: 1    meloxicam (Mobic) 15 mg tablet, Take 1 tablet (15 mg) by mouth once daily., Disp: 90 tablet, Rfl: 1    pantoprazole (ProtoNix) 40 mg EC tablet, Take 1 tablet (40 mg) by mouth once daily in the morning. Take before meals., Disp: 90 tablet, Rfl: 1    PARoxetine (Paxil) 10 mg tablet, Take 1 tablet (10 mg) by mouth once daily., Disp: , Rfl:   Medical History:  Past Medical History:   Diagnosis Date    Chronic rhinitis 2014    Chronic rhinitis    Encounter for screening for malignant neoplasm of colon 2019    Colon cancer screening    Fever, unspecified 2015    Fever of unknown origin    Personal history of other diseases of the digestive system     History of esophageal reflux    Spinal stenosis, site unspecified     Spinal stenosis     Surgical History:  Past Surgical History:   Procedure Laterality Date    BELT ABDOMINOPLASTY  2014    Abdominoplasty     SECTION, CLASSIC  2013     Section    KNEE ARTHROPLASTY  2013    Knee Arthroplasty    LUMBAR FUSION  2013    Lumbar Vertebral Fusion    OTHER SURGICAL HISTORY  2015    Breast Surgery Enlargement Procedure    OTHER SURGICAL HISTORY  2014    Breast Surgery Mastopexy    OTHER SURGICAL HISTORY  2014    Stapedectomy - Left Ear    SINUS SURGERY  2013    Sinus Surgery     Family History:  Family History   Problem Relation Name Age of Onset    Heart failure Mother      Osteoarthritis Mother      Coronary artery disease Mother      Hypertension Mother      Hypertension Father      Hypertension Sister      Migraines Sister       Social History:  Social History     Socioeconomic History    Marital status:      Spouse name: Not on file    Number of children: Not on file    Years of education: Not on file    Highest education level: Not on file   Occupational History    Not on file   Tobacco Use    Smoking status: Never    Smokeless tobacco: Never   Substance  "and Sexual Activity    Alcohol use: Yes     Comment: < 2 per week    Drug use: Never    Sexual activity: Not on file   Other Topics Concern    Not on file   Social History Narrative    Not on file     Social Determinants of Health     Financial Resource Strain: Not on file   Food Insecurity: Not on file   Transportation Needs: Not on file   Physical Activity: Not on file   Stress: Not on file   Social Connections: Not on file   Intimate Partner Violence: Not on file   Housing Stability: Not on file       Medical Review Of Systems:  Denies SOB, chest pain , n/v/d, no falls,     Psychiatric Review Of Systems:  Psychiatric ROS - Adult  Anxiety: General Anxiety Disorder (BONNY)BONNY Behaviors: difficult to control worry, excessive anxiety/worry, and easily fatigued  Depression: energy and interest  Delirium: negative  Psychosis: negative  Christianne: negative  Safety Issues: none    Disordered Eating Symptoms:denies   Inattentive Symptoms: improved attention with adderall    Hyperactive/Impulsive Symptoms:n/a     Trauma Symptoms:n/a        There were no vitals taken for this visit.     LABS  Lab Results   Component Value Date    TSH 1.90 06/03/2023    HDL 56.2 06/03/2023      Lab Results   Component Value Date    CHOL 195 06/03/2023    CHOL 181 07/06/2022    CHOL 202 (H) 05/28/2021     Lab Results   Component Value Date    HDL 56.2 06/03/2023    HDL 57.5 07/06/2022    HDL 64.9 05/28/2021     No results found for: \"LDLCALC\"  Lab Results   Component Value Date    TRIG 132 06/03/2023    TRIG 77 07/06/2022    TRIG 79 05/28/2021     No components found for: \"CHOLHDL\"     Objective   Mental Status Exam:  Mental Status Examination  General Appearance: Well groomed, appropriate eye contact.  Gait/Station: not examined   Speech: Normal rate, volume, prosody  Mood: fair, lacks interest to do things secondary to anxiety  Affect: Euthymic, full-range  Thought Process: Linear, goal directed  Thought Associations: No loosening of " associations  Thought Content: normal  Perception: No perceptual abnormalities noted  Level of Consciousness: Alert  Orientation: Alert and oriented to person, place, time and situation  Attention and Concentration: Intact  Recent Memory: Intact as evidenced by ability to recall details from the past 24 hours   Remote Memory: Intact as evidenced by ability to recall previous medical issues   Executive function: Intact  Language: no deficits noted   Fund of knowledge: Good  Insight: intact   Judgment: intact         Assessment/Plan   67 y/o  female who presented 9/20/2021 as a transfer of care regarding management of bipolar disorder (I versus II) and dx of ADHD. She has generally been doing well with a combination of lamictal, paxil, however she experienced high anxiety during move summer 2023 and notes she has been feeling less interested/less social over the past month or so.  She has continued to take  Adderall There has been no death wish or SI for years and no h/o psychosis. There is no h/o self harm. .     dx: Bipolar II versus I; ADHD; anxiety unspecified     1) continue lamictal 300 mg daily, ; increase paxil to 15 mg daily   2) continue adderall 30 mg qam per pcp   3) reviewed available labs,  4) f/u 4 weeks   Patient Active Problem List   Diagnosis    Constipation    Abnormal glucose    Abnormal mammogram of left breast    Anemia    Anxiety    ADHD (attention deficit hyperactivity disorder)    Benign essential hypertension    Bipolar mood disorder (CMS/HCC)    Depression    Daytime sleepiness    Dysphagia    GERD (gastroesophageal reflux disease)    Eyelash scantiness    Pyelonephritis    Somatic dysfunction of lower extremities    Somatic dysfunction of lumbar region    Somatic dysfunction of pelvic region    Somatic dysfunction of sacral region    Arthritis    Asymmetric SNHL (sensorineural hearing loss)    Chronic rhinitis    Snoring    Elevated fasting lipid profile    Hyperlipemia    Hematuria     Hip pain    Lichen sclerosus    Obstructive sleep apnea    Osteoarthritis of knee    Trigger finger    Medicare annual wellness visit, subsequent    High risk medication use    Vitamin D insufficiency    Hx of primary hypertension           Review with patient: Treatment plan reviewed with the patient.  Medication risks/benefit reviewed with the patient  Psychiatric Risk Assessment  Violence Risk Assessment: none  Acute Risk of Harm to Others is Considered: low   Suicide Risk Assessment: age > 65 yrs old and current psychiatric illness  Protective Factors against Suicide: adherence to  treatment, employment, fear of social disapproval, fear of suicide, sense of responsibility toward family, social support/connectedness, and strong coping skills  Acute Risk of Harm to Self is Considered: low        Luz Marina Simpson, DO

## 2023-11-10 ENCOUNTER — TELEPHONE (OUTPATIENT)
Dept: PRIMARY CARE | Facility: CLINIC | Age: 68
End: 2023-11-10
Payer: MEDICARE

## 2023-11-10 NOTE — TELEPHONE ENCOUNTER
Patient called in asking if you would be willing to put in a referral for pain management to get injections done on her hip, she has used Dr. Duran in the past. Or would you like to see her first?

## 2023-11-21 ENCOUNTER — LAB (OUTPATIENT)
Dept: LAB | Facility: LAB | Age: 68
End: 2023-11-21
Payer: MEDICARE

## 2023-11-21 DIAGNOSIS — N28.9 DECREASED RENAL FUNCTION: ICD-10-CM

## 2023-11-21 DIAGNOSIS — E78.2 MIXED HYPERLIPIDEMIA: ICD-10-CM

## 2023-11-21 DIAGNOSIS — I10 BENIGN ESSENTIAL HYPERTENSION: ICD-10-CM

## 2023-11-21 DIAGNOSIS — D50.8 OTHER IRON DEFICIENCY ANEMIA: ICD-10-CM

## 2023-11-21 LAB
ALBUMIN SERPL BCP-MCNC: 4.6 G/DL (ref 3.4–5)
ALP SERPL-CCNC: 94 U/L (ref 33–136)
ALT SERPL W P-5'-P-CCNC: 10 U/L (ref 7–45)
ANION GAP SERPL CALC-SCNC: 13 MMOL/L (ref 10–20)
AST SERPL W P-5'-P-CCNC: 11 U/L (ref 9–39)
BILIRUB DIRECT SERPL-MCNC: 0.1 MG/DL (ref 0–0.3)
BILIRUB SERPL-MCNC: 0.4 MG/DL (ref 0–1.2)
BUN SERPL-MCNC: 27 MG/DL (ref 6–23)
CALCIUM SERPL-MCNC: 10.2 MG/DL (ref 8.6–10.6)
CHLORIDE SERPL-SCNC: 103 MMOL/L (ref 98–107)
CO2 SERPL-SCNC: 29 MMOL/L (ref 21–32)
CREAT SERPL-MCNC: 1.45 MG/DL (ref 0.5–1.05)
CREAT UR-MCNC: 90.6 MG/DL (ref 20–320)
ERYTHROCYTE [DISTWIDTH] IN BLOOD BY AUTOMATED COUNT: 13.2 % (ref 11.5–14.5)
GFR SERPL CREATININE-BSD FRML MDRD: 39 ML/MIN/1.73M*2
GLUCOSE SERPL-MCNC: 79 MG/DL (ref 74–99)
HCT VFR BLD AUTO: 38.3 % (ref 36–46)
HGB BLD-MCNC: 12.5 G/DL (ref 12–16)
MCH RBC QN AUTO: 30.6 PG (ref 26–34)
MCHC RBC AUTO-ENTMCNC: 32.6 G/DL (ref 32–36)
MCV RBC AUTO: 94 FL (ref 80–100)
MICROALBUMIN UR-MCNC: <7 MG/L
MICROALBUMIN/CREAT UR: NORMAL MG/G{CREAT}
NRBC BLD-RTO: 0 /100 WBCS (ref 0–0)
PHOSPHATE SERPL-MCNC: 3.6 MG/DL (ref 2.5–4.9)
PLATELET # BLD AUTO: 322 X10*3/UL (ref 150–450)
POTASSIUM SERPL-SCNC: 5.1 MMOL/L (ref 3.5–5.3)
PROT SERPL-MCNC: 7.4 G/DL (ref 6.4–8.2)
RBC # BLD AUTO: 4.09 X10*6/UL (ref 4–5.2)
SODIUM SERPL-SCNC: 140 MMOL/L (ref 136–145)
WBC # BLD AUTO: 7.7 X10*3/UL (ref 4.4–11.3)

## 2023-11-21 PROCEDURE — 84100 ASSAY OF PHOSPHORUS: CPT

## 2023-11-21 PROCEDURE — 82570 ASSAY OF URINE CREATININE: CPT

## 2023-11-21 PROCEDURE — 82043 UR ALBUMIN QUANTITATIVE: CPT

## 2023-11-21 PROCEDURE — 36415 COLL VENOUS BLD VENIPUNCTURE: CPT

## 2023-11-21 PROCEDURE — 82248 BILIRUBIN DIRECT: CPT

## 2023-11-21 PROCEDURE — 80053 COMPREHEN METABOLIC PANEL: CPT

## 2023-11-21 PROCEDURE — 85027 COMPLETE CBC AUTOMATED: CPT

## 2023-11-24 ENCOUNTER — LAB (OUTPATIENT)
Dept: LAB | Facility: LAB | Age: 68
End: 2023-11-24
Payer: MEDICARE

## 2023-11-24 DIAGNOSIS — R53.83 OTHER FATIGUE: ICD-10-CM

## 2023-11-24 DIAGNOSIS — E78.2 MIXED HYPERLIPIDEMIA: ICD-10-CM

## 2023-11-24 PROCEDURE — 84443 ASSAY THYROID STIM HORMONE: CPT

## 2023-11-24 PROCEDURE — 80061 LIPID PANEL: CPT

## 2023-11-24 PROCEDURE — 36415 COLL VENOUS BLD VENIPUNCTURE: CPT

## 2023-11-25 LAB
CHOLEST SERPL-MCNC: 218 MG/DL (ref 0–199)
CHOLESTEROL/HDL RATIO: 3.5
HDLC SERPL-MCNC: 61.9 MG/DL
LDLC SERPL CALC-MCNC: 130 MG/DL
NON HDL CHOLESTEROL: 156 MG/DL (ref 0–149)
TRIGL SERPL-MCNC: 133 MG/DL (ref 0–149)
VLDL: 27 MG/DL (ref 0–40)

## 2023-11-28 ENCOUNTER — OFFICE VISIT (OUTPATIENT)
Dept: PRIMARY CARE | Facility: CLINIC | Age: 68
End: 2023-11-28
Payer: MEDICARE

## 2023-11-28 VITALS
OXYGEN SATURATION: 95 % | DIASTOLIC BLOOD PRESSURE: 70 MMHG | WEIGHT: 179.8 LBS | BODY MASS INDEX: 33.97 KG/M2 | SYSTOLIC BLOOD PRESSURE: 122 MMHG | HEART RATE: 84 BPM

## 2023-11-28 DIAGNOSIS — K21.9 GASTROESOPHAGEAL REFLUX DISEASE WITHOUT ESOPHAGITIS: ICD-10-CM

## 2023-11-28 DIAGNOSIS — F98.8 ATTENTION DEFICIT DISORDER (ADD) WITHOUT HYPERACTIVITY: Primary | ICD-10-CM

## 2023-11-28 DIAGNOSIS — Z23 NEED FOR PNEUMOCOCCAL 20-VALENT CONJUGATE VACCINATION: ICD-10-CM

## 2023-11-28 DIAGNOSIS — F90.0 ATTENTION DEFICIT HYPERACTIVITY DISORDER (ADHD), PREDOMINANTLY INATTENTIVE TYPE: ICD-10-CM

## 2023-11-28 DIAGNOSIS — Z79.899 HIGH RISK MEDICATION USE: ICD-10-CM

## 2023-11-28 DIAGNOSIS — Z12.31 SCREENING MAMMOGRAM FOR BREAST CANCER: ICD-10-CM

## 2023-11-28 DIAGNOSIS — E78.2 MIXED HYPERLIPIDEMIA: ICD-10-CM

## 2023-11-28 DIAGNOSIS — N18.32 STAGE 3B CHRONIC KIDNEY DISEASE (MULTI): ICD-10-CM

## 2023-11-28 DIAGNOSIS — R53.83 OTHER FATIGUE: ICD-10-CM

## 2023-11-28 DIAGNOSIS — I10 BENIGN ESSENTIAL HYPERTENSION: ICD-10-CM

## 2023-11-28 DIAGNOSIS — F41.9 ANXIETY: ICD-10-CM

## 2023-11-28 LAB
AMPHETAMINES UR QL SCN: NORMAL
BARBITURATES UR QL SCN: NORMAL
BENZODIAZ UR QL SCN: NORMAL
BZE UR QL SCN: NORMAL
CANNABINOIDS UR QL SCN: NORMAL
FENTANYL+NORFENTANYL UR QL SCN: NORMAL
OPIATES UR QL SCN: NORMAL
OXYCODONE+OXYMORPHONE UR QL SCN: NORMAL
PCP UR QL SCN: NORMAL
TSH SERPL-ACNC: 2.91 MIU/L (ref 0.44–3.98)

## 2023-11-28 PROCEDURE — 1159F MED LIST DOCD IN RCRD: CPT | Performed by: FAMILY MEDICINE

## 2023-11-28 PROCEDURE — G0009 ADMIN PNEUMOCOCCAL VACCINE: HCPCS | Performed by: FAMILY MEDICINE

## 2023-11-28 PROCEDURE — 1125F AMNT PAIN NOTED PAIN PRSNT: CPT | Performed by: FAMILY MEDICINE

## 2023-11-28 PROCEDURE — 1160F RVW MEDS BY RX/DR IN RCRD: CPT | Performed by: FAMILY MEDICINE

## 2023-11-28 PROCEDURE — 3074F SYST BP LT 130 MM HG: CPT | Performed by: FAMILY MEDICINE

## 2023-11-28 PROCEDURE — 90677 PCV20 VACCINE IM: CPT | Performed by: FAMILY MEDICINE

## 2023-11-28 PROCEDURE — 80307 DRUG TEST PRSMV CHEM ANLYZR: CPT

## 2023-11-28 PROCEDURE — 80324 DRUG SCREEN AMPHETAMINES 1/2: CPT

## 2023-11-28 PROCEDURE — 3078F DIAST BP <80 MM HG: CPT | Performed by: FAMILY MEDICINE

## 2023-11-28 PROCEDURE — 1036F TOBACCO NON-USER: CPT | Performed by: FAMILY MEDICINE

## 2023-11-28 PROCEDURE — 99214 OFFICE O/P EST MOD 30 MIN: CPT | Performed by: FAMILY MEDICINE

## 2023-11-28 RX ORDER — GABAPENTIN 300 MG/1
300 CAPSULE ORAL 2 TIMES DAILY
COMMUNITY
Start: 2023-11-08

## 2023-11-28 RX ORDER — LISINOPRIL 20 MG/1
20 TABLET ORAL DAILY
Qty: 90 TABLET | Refills: 1 | Status: SHIPPED | OUTPATIENT
Start: 2023-11-28

## 2023-11-28 RX ORDER — ATORVASTATIN CALCIUM 20 MG/1
20 TABLET, FILM COATED ORAL DAILY
Qty: 30 TABLET | Refills: 5 | Status: SHIPPED | OUTPATIENT
Start: 2023-11-28 | End: 2024-05-26

## 2023-11-28 RX ORDER — PANTOPRAZOLE SODIUM 40 MG/1
40 TABLET, DELAYED RELEASE ORAL
Qty: 90 TABLET | Refills: 3 | Status: SHIPPED | OUTPATIENT
Start: 2023-11-28

## 2023-11-28 RX ORDER — HYDROCHLOROTHIAZIDE 25 MG/1
25 TABLET ORAL DAILY
Qty: 90 TABLET | Refills: 1 | Status: SHIPPED | OUTPATIENT
Start: 2023-11-28

## 2023-11-28 RX ORDER — DEXTROAMPHETAMINE SACCHARATE, AMPHETAMINE ASPARTATE, DEXTROAMPHETAMINE SULFATE AND AMPHETAMINE SULFATE 7.5; 7.5; 7.5; 7.5 MG/1; MG/1; MG/1; MG/1
30 TABLET ORAL
Qty: 60 TABLET | Refills: 0 | Status: SHIPPED | OUTPATIENT
Start: 2024-01-27

## 2023-11-28 RX ORDER — DEXTROAMPHETAMINE SACCHARATE, AMPHETAMINE ASPARTATE, DEXTROAMPHETAMINE SULFATE AND AMPHETAMINE SULFATE 7.5; 7.5; 7.5; 7.5 MG/1; MG/1; MG/1; MG/1
30 TABLET ORAL
Qty: 60 TABLET | Refills: 0 | Status: SHIPPED | OUTPATIENT
Start: 2023-11-28 | End: 2023-12-28

## 2023-11-28 RX ORDER — DEXTROAMPHETAMINE SACCHARATE, AMPHETAMINE ASPARTATE, DEXTROAMPHETAMINE SULFATE AND AMPHETAMINE SULFATE 7.5; 7.5; 7.5; 7.5 MG/1; MG/1; MG/1; MG/1
30 TABLET ORAL
Qty: 60 TABLET | Refills: 0 | Status: SHIPPED | OUTPATIENT
Start: 2023-12-28

## 2023-11-28 NOTE — PATIENT INSTRUCTIONS
CKD (CHRONIC KIDNEY DISEASE): follow up with nephrology Dr. Almeida    SLEEP APNEA: will look into old sleep study results  Please check with your insurance and let us know who your DME (durable medical equipment) supplier is and we may be able to put in orders for new supplies and have your data reviewed.    ADHD: restarting Adderall.  Urine screening performed today - follow up 6 months.  Refills will send to your pharmacy every 30 days for up to 3 refills - you will need to call us to refill for the additional 3 months.

## 2023-11-28 NOTE — PROGRESS NOTES
Subjective   Patient ID: Elsy Molina is a 68 y.o. female who presents for Follow-up (Medication Refills, lab results).  HPI    FATIGUE: not able to get up readily in the morning  Sleeping well but tired during the day  Can't wait to go to bed   She has been off Adderall for several months    CPAP wearing regularly  Has used for the last 3 + years  [employer]     ADD: has been out of Adderall for several weeks.  OARRS:  Jennifer Gama DO on 11/28/2023 10:27 AM  I have personally reviewed the OARRS report for Elsy Molina. I have considered the risks of abuse, dependence, addiction and diversion    Is the patient prescribed a combination of a benzodiazepine and opioid?  No    Last Urine Drug Screen / ordered today: Yes  Recent Results (from the past 8760 hour(s))   Drug Screen, Urine With Reflex to Confirmation    Collection Time: 11/28/23  9:50 AM   Result Value Ref Range    Amphetamine Screen, Urine Presumptive Negative Presumptive Negative    Barbiturate Screen, Urine Presumptive Negative Presumptive Negative    Benzodiazepines Screen, Urine Presumptive Negative Presumptive Negative    Cannabinoid Screen, Urine Presumptive Negative Presumptive Negative    Cocaine Metabolite Screen, Urine Presumptive Negative Presumptive Negative    Fentanyl Screen, Urine Presumptive Negative Presumptive Negative    Opiate Screen, Urine Presumptive Negative Presumptive Negative    Oxycodone Screen, Urine Presumptive Negative Presumptive Negative    PCP Screen, Urine Presumptive Negative Presumptive Negative   Amphetamine Confirm, Urine    Collection Time: 11/28/23  9:50 AM   Result Value Ref Range    Methamphetamine Quant, Ur <200 ng/mL    MDA, Urine <200 ng/mL    MDEA, Urine <200 ng/mL    Phentermine,Urine <200 ng/mL    Amphetamines,Urine <50 ng/mL    MDMA, Urine <200 ng/mL     Results are as expected.     Controlled Substance Agreement:  Date of the Last Agreement: 5/30/2023  Reviewed Controlled Substance Agreement  including but not limited to the benefits, risks, and alternatives to treatment with a Controlled Substance medication(s).    Stimulants:   What is the patient's goal of therapy? Maintain adequate attention at work  Is this being achieved with current treatment? yes    Activities of Daily Living:   Is your overall impression that this patient is benefiting (symptom reduction outweighs side effects) from stimulant therapy? Yes     1. Physical Functioning: Same  2. Family Relationship: Same  3. Social Relationship: Same  4. Mood: Worse  5. Sleep Patterns: Same  6. Overall Function: Same       Review of Systems    Review of Systems negative except as noted in HPI and Chief complaint.     Objective               Physical Exam  Constitutional:       General: She is not in acute distress.     Appearance: Normal appearance. She is not ill-appearing.   HENT:      Head: Normocephalic and atraumatic.   Neck:      Vascular: No carotid bruit.   Cardiovascular:      Rate and Rhythm: Normal rate and regular rhythm.      Pulses: Normal pulses.      Heart sounds: Normal heart sounds. No murmur heard.     No gallop.   Pulmonary:      Effort: Pulmonary effort is normal.      Breath sounds: Normal breath sounds. No wheezing, rhonchi or rales.   Musculoskeletal:      Cervical back: Normal range of motion and neck supple. No rigidity or tenderness.   Lymphadenopathy:      Cervical: No cervical adenopathy.   Skin:     General: Skin is warm and dry.   Neurological:      Mental Status: She is alert.   Psychiatric:         Mood and Affect: Mood normal.         Behavior: Behavior normal.       /70 (BP Location: Left arm, Patient Position: Sitting, BP Cuff Size: Adult)   Pulse 84   Wt 81.6 kg (179 lb 12.8 oz)   SpO2 95%   BMI 33.97 kg/m²      Assessment/Plan   Problem List Items Addressed This Visit       Anxiety     Stable on current regimen.  Follow up a least annually, sooner with any problems or concerns.         ADHD (attention  deficit hyperactivity disorder)     Refilling ADD medications at current doses.  Reviewed policy for controlled substances - you are not to fill early or request refills early.  Any variance from policy could results in discontinuation of therapy and discharge from our care.  See signed Controlled Substance Contract forms which are scanned.  OARRS report reviewed and no suspicious activity noted.  Follow up visits every 3-6 months at minimum.          Relevant Medications    amphetamine-dextroamphetamine (Adderall) 30 mg tablet    amphetamine-dextroamphetamine (Adderall) 30 mg tablet (Start on 12/28/2023)    amphetamine-dextroamphetamine (Adderall) 30 mg tablet (Start on 1/27/2024)    Benign essential hypertension     Bp controlled today.  Refilling medications at current dosages.  Follow up at least every 6 months.         Relevant Medications    hydroCHLOROthiazide (HYDRODiuril) 25 mg tablet    lisinopril 20 mg tablet    GERD (gastroesophageal reflux disease)    Relevant Medications    pantoprazole (ProtoNix) 40 mg EC tablet    Hyperlipemia     Stable on current regimen.  Follow up at least annually.         Relevant Medications    atorvastatin (Lipitor) 20 mg tablet    Other Relevant Orders    Referral to Nephrology    High risk medication use    Relevant Orders    Amphetamine Confirm, Urine (Completed)    Drug Screen, Urine With Reflex to Confirmation (Completed)     Other Visit Diagnoses       Attention deficit disorder (ADD) without hyperactivity    -  Primary    Other fatigue        Relevant Orders    TSH with reflex to Free T4 if abnormal (Completed)    Screening mammogram for breast cancer        Relevant Orders    BI mammo bilateral screening tomosynthesis    Stage 3b chronic kidney disease (CMS/HCC)        Relevant Orders    Referral to Nephrology    Need for pneumococcal 20-valent conjugate vaccination        Relevant Orders    Pneumococcal conjugate vaccine, 20-valent (PREVNAR 20) (Completed)           FOLLOW UP 6 MONTHS - SOONER PENDING REVIEW OF ABOVE.

## 2023-12-01 LAB
AMPHET UR-MCNC: <50 NG/ML
MDA UR-MCNC: <200 NG/ML
MDEA UR-MCNC: <200 NG/ML
MDMA UR-MCNC: <200 NG/ML
METHAMPHET UR-MCNC: <200 NG/ML
PHENTERMINE UR CFM-MCNC: <200 NG/ML

## 2023-12-12 NOTE — ASSESSMENT & PLAN NOTE
Bp controlled today.  Refilling medications at current dosages.  Follow up at least every 6 months.

## 2023-12-21 ENCOUNTER — OFFICE VISIT (OUTPATIENT)
Dept: PRIMARY CARE | Facility: CLINIC | Age: 68
End: 2023-12-21
Payer: MEDICARE

## 2023-12-21 VITALS
WEIGHT: 176 LBS | OXYGEN SATURATION: 97 % | HEART RATE: 77 BPM | SYSTOLIC BLOOD PRESSURE: 112 MMHG | BODY MASS INDEX: 33.25 KG/M2 | DIASTOLIC BLOOD PRESSURE: 64 MMHG

## 2023-12-21 DIAGNOSIS — R42 VERTIGO: Primary | ICD-10-CM

## 2023-12-21 DIAGNOSIS — E78.2 MIXED HYPERLIPIDEMIA: ICD-10-CM

## 2023-12-21 DIAGNOSIS — N18.32 STAGE 3B CHRONIC KIDNEY DISEASE (MULTI): ICD-10-CM

## 2023-12-21 DIAGNOSIS — M46.1 SACROILIITIS, NOT ELSEWHERE CLASSIFIED (CMS-HCC): ICD-10-CM

## 2023-12-21 PROCEDURE — 3074F SYST BP LT 130 MM HG: CPT | Performed by: FAMILY MEDICINE

## 2023-12-21 PROCEDURE — 1125F AMNT PAIN NOTED PAIN PRSNT: CPT | Performed by: FAMILY MEDICINE

## 2023-12-21 PROCEDURE — 1160F RVW MEDS BY RX/DR IN RCRD: CPT | Performed by: FAMILY MEDICINE

## 2023-12-21 PROCEDURE — 3078F DIAST BP <80 MM HG: CPT | Performed by: FAMILY MEDICINE

## 2023-12-21 PROCEDURE — 1159F MED LIST DOCD IN RCRD: CPT | Performed by: FAMILY MEDICINE

## 2023-12-21 PROCEDURE — 99213 OFFICE O/P EST LOW 20 MIN: CPT | Performed by: FAMILY MEDICINE

## 2023-12-21 PROCEDURE — 1036F TOBACCO NON-USER: CPT | Performed by: FAMILY MEDICINE

## 2023-12-21 RX ORDER — MECLIZINE HYDROCHLORIDE 25 MG/1
25 TABLET ORAL 3 TIMES DAILY PRN
Qty: 30 TABLET | Refills: 1 | Status: SHIPPED | OUTPATIENT
Start: 2023-12-21 | End: 2024-12-20

## 2023-12-21 ASSESSMENT — ENCOUNTER SYMPTOMS
OCCASIONAL FEELINGS OF UNSTEADINESS: 0
DEPRESSION: 0
LOSS OF SENSATION IN FEET: 0

## 2023-12-21 ASSESSMENT — PATIENT HEALTH QUESTIONNAIRE - PHQ9
SUM OF ALL RESPONSES TO PHQ9 QUESTIONS 1 AND 2: 0
2. FEELING DOWN, DEPRESSED OR HOPELESS: NOT AT ALL
1. LITTLE INTEREST OR PLEASURE IN DOING THINGS: NOT AT ALL

## 2023-12-21 NOTE — PATIENT INSTRUCTIONS

## 2023-12-21 NOTE — PROGRESS NOTES
Subjective   Patient ID: Elsy Molina is a 68 y.o. female who presents for Dizziness (DIZZINESS FROM CONCUSSION ).    HPI    DIZZINESS: started 2 weeks ago, spinning sensation  Worse with certain eye or head motions    Melissa fell off chair and hit her head on the floor.  No dizziness or chest pain prior to fall.  States she just slipped and missed the chair.    No LOC - she did have a mild headache and vertigo-like symptoms for approximately 1 - 2 weeks after the fall.  Seemed to resolve but recurring the last 2 weeks.    No vision changes, no ringing in her ears  No vomiting or diarrhea.    Review of Systems    Review of Systems negative except as noted in HPI and Chief complaint.     Objective                 /64   Pulse 77   Wt 79.8 kg (176 lb)   SpO2 97%   BMI 33.25 kg/m²      Physical Exam  Constitutional:       General: She is not in acute distress.     Appearance: Normal appearance. She is not ill-appearing.   HENT:      Head: Normocephalic and atraumatic.      Right Ear: Tympanic membrane, ear canal and external ear normal.      Left Ear: Tympanic membrane, ear canal and external ear normal.      Nose: Nose normal. No congestion or rhinorrhea.      Mouth/Throat:      Mouth: Mucous membranes are moist.      Pharynx: No oropharyngeal exudate or posterior oropharyngeal erythema.   Eyes:      General:         Right eye: No discharge.         Left eye: No discharge.      Extraocular Movements: Extraocular movements intact.      Pupils: Pupils are equal, round, and reactive to light.      Comments: No nystagmus     Neck:      Vascular: No carotid bruit.   Cardiovascular:      Rate and Rhythm: Normal rate and regular rhythm.      Pulses: Normal pulses.      Heart sounds: Normal heart sounds. No murmur heard.     No gallop.   Pulmonary:      Effort: Pulmonary effort is normal.      Breath sounds: Normal breath sounds. No wheezing, rhonchi or rales.   Musculoskeletal:      Cervical back: Normal range of  motion and neck supple. No rigidity or tenderness.   Lymphadenopathy:      Cervical: No cervical adenopathy.   Skin:     General: Skin is warm and dry.   Neurological:      Mental Status: She is alert.   Psychiatric:         Mood and Affect: Mood normal.         Behavior: Behavior normal.         Assessment/Plan   Problem List Items Addressed This Visit       Hyperlipemia    Sacroiliitis, not elsewhere classified (CMS/HCC)     Stable on current regimen - Gabapentin helping as well.          Other Visit Diagnoses       Vertigo    -  Primary    Relevant Medications    meclizine (Antivert) 25 mg tablet    Other Relevant Orders    Referral to Physical Therapy    Stage 3b chronic kidney disease (CMS/[facility])            Referral updated for nephrology.    Call if symptoms persist or worsen.  Meclizine side effects and expected treatment  course discussed.     Patient was identified as a fall risk. Risk prevention instructions provided.   No

## 2023-12-26 ENCOUNTER — APPOINTMENT (OUTPATIENT)
Dept: RADIOLOGY | Facility: CLINIC | Age: 68
End: 2023-12-26
Payer: MEDICARE

## 2024-01-29 ENCOUNTER — TELEMEDICINE (OUTPATIENT)
Dept: BEHAVIORAL HEALTH | Facility: CLINIC | Age: 69
End: 2024-01-29
Payer: MEDICARE

## 2024-01-29 DIAGNOSIS — F41.9 ANXIETY: ICD-10-CM

## 2024-01-29 DIAGNOSIS — F90.0 ATTENTION DEFICIT HYPERACTIVITY DISORDER (ADHD), PREDOMINANTLY INATTENTIVE TYPE: ICD-10-CM

## 2024-01-29 DIAGNOSIS — F31.70 BIPOLAR AFFECTIVE DISORDER IN REMISSION (MULTI): ICD-10-CM

## 2024-01-29 PROCEDURE — 99214 OFFICE O/P EST MOD 30 MIN: CPT | Performed by: PSYCHIATRY & NEUROLOGY

## 2024-01-29 RX ORDER — PAROXETINE 30 MG/1
15 TABLET, FILM COATED ORAL DAILY
Qty: 45 TABLET | Refills: 1 | Status: SHIPPED | OUTPATIENT
Start: 2024-01-29 | End: 2024-07-27

## 2024-01-29 RX ORDER — LAMOTRIGINE 150 MG/1
300 TABLET ORAL DAILY
Qty: 180 TABLET | Refills: 1 | Status: SHIPPED | OUTPATIENT
Start: 2024-01-29 | End: 2024-05-17 | Stop reason: SDUPTHER

## 2024-01-29 NOTE — PATIENT INSTRUCTIONS
-continue lamictal 300 mg daily and paxil 15 mg daily   -I will see you in about 8 weeks  -call [phone] with questions or concerns   -call nephrology to be placed on the waiting list for cancellations for any available nephrologist  -call your primary care physician to discuss hydrochlorothiazide use in the setting of recent decline in renal function     -If there are any thoughts of harming your self, harming others, concerning worsening of your mental health symptoms or concerning medication side effects, please call [phone] or reports to the nearest emergency room.

## 2024-01-29 NOTE — PROGRESS NOTES
"Outpatient Psychiatry          Reason for Visit: routine follow up for Bipolar II versus I; ADHD; anxiety unspecified. At last visit paxil increased to 15 mg and she reports she is less aggravated, \"little things do not bother me like they used to\"          HPI:69 y/o female who presented 9/20/2021 for management of ADHD and bipolar II. She is better settled into her new condo. She is living close to kids and her sister. She has bee feeling more relaxed and anxiety level manageable. Denies difficulty with social anxiety.Notes there is chronic level of low motivation, feels chronically less happy than others, not very excited about socializing. She does see family but often does not feel like going out (she has always been this was and never fostered many new friendships). Wonders if procrastination is related more do depression versus attention difficulties/lazy. Not sad, no asia   She continues to take adderall per pcp and focus- only taking am dose (will ask pcp for extended release form since she dose not recall midday dose)     No death wish or SI.      Told she has decline in renal function - has nephrologist appointment planned.   She recalls depression beginning by her teen years. Depression is chronic and never seems to fully resolve. Depressive exacerbations consist of tearfulness, lacks motivation/no energy, isolates, cancels appointments, isolates, irritable, increased sleep, lacks self confidence . There has been no death wish or SI for years. No hallucinations, no paranoia. Elevated moods have consisted of impulsive behavior (resulted in divorce, met with men she knew nothing about), spends a great deal of money, excited, goes off on tangents when speaking, starting many projects, hyper, - she recalls three episodes of highly impulsive behavior/out of character that lasted for a few months.   Denies excessive chronic daily worry but can be ruminative, no panic attack, no compulsions.   She has been dx " with ADHD by some sort of testing. She recalls being easily frustrated during school, but does not recall having specific difficulties paying attention during school. She thinks Adderall helps her focus , stay on task. . The last adderall rx was 2/2021. historically is has been hard to concentrate at work as she thinks of other things prior to finishing what she is doing.       PAST PSYCH H/O: bipolar II dx 4 years ago, dx ADHD dx around 2014. First mental health treatment by pcp with anti depressants around her mid 30's and she has remained on meds since that time. She has been in counseling in the past.   PAST PSYCH HOSP: denied  SELF HARM: none , but had SI in her 30's and 40's  PAST PSYCH MEDS: adderall, many antidepressants- cymbalta, prozac, paxil, effexor, wellbutrin, lexapro, zoloft. lamcital,     SUBSTANCE USE H/O: no h/o tobacco/nicotine, drinks wine 1-2 times per week - about 1 glass, denies drug use    FAMILY PSYCH H/O: sisters- depression, mom - depression, niece- suicided 1 year ago (24 y/o)     PAST MED H/O: OA, GERD   ALL: NKDA       SOCIAL H/O: lives alone,  X 1, 3 grown children who she is. close with , close with her 2 sisters. 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 denies victimization h/o        Current Medications:    Current Outpatient Medications:     amphetamine-dextroamphetamine (Adderall) 30 mg tablet, Take 1 tablet (30 mg) by mouth 2 times daily after breakfast and lunch., Disp: 60 tablet, Rfl: 0    amphetamine-dextroamphetamine (Adderall) 30 mg tablet, Take 1 tablet (30 mg) by mouth 2 times daily after breakfast and lunch. Do not start before December 28, 2023., Disp: 60 tablet, Rfl: 0    amphetamine-dextroamphetamine (Adderall) 30 mg tablet, Take 1 tablet (30 mg) by mouth 2 times daily after breakfast and lunch. Do not start before January 27, 2024., Disp: 60 tablet, Rfl: 0     atorvastatin (Lipitor) 20 mg tablet, Take 1 tablet (20 mg) by mouth once daily., Disp: 30 tablet, Rfl: 5    gabapentin (Neurontin) 300 mg capsule, Take 1 capsule (300 mg) by mouth 2 times a day., Disp: , Rfl:     hydroCHLOROthiazide (HYDRODiuril) 25 mg tablet, Take 1 tablet (25 mg) by mouth once daily., Disp: 90 tablet, Rfl: 1    lamoTRIgine (LaMICtal) 150 mg tablet, Take 2 tablets (300 mg) by mouth once daily., Disp: 180 tablet, Rfl: 0    lisinopril 20 mg tablet, Take 1 tablet (20 mg) by mouth once daily., Disp: 90 tablet, Rfl: 1    meclizine (Antivert) 25 mg tablet, Take 1 tablet (25 mg) by mouth 3 times a day as needed for dizziness., Disp: 30 tablet, Rfl: 1    pantoprazole (ProtoNix) 40 mg EC tablet, Take 1 tablet (40 mg) by mouth once daily in the morning. Take before meals., Disp: 90 tablet, Rfl: 3    PARoxetine (Paxil) 30 mg tablet, Take 0.5 tablets (15 mg) by mouth once daily., Disp: 15 tablet, Rfl: 1  Medical History:  Past Medical History:   Diagnosis Date    Chronic rhinitis 2014    Chronic rhinitis    Encounter for screening for malignant neoplasm of colon 2019    Colon cancer screening    Fever, unspecified 2015    Fever of unknown origin    Personal history of other diseases of the digestive system     History of esophageal reflux    Spinal stenosis, site unspecified     Spinal stenosis     Surgical History:  Past Surgical History:   Procedure Laterality Date    BELT ABDOMINOPLASTY  2014    Abdominoplasty     SECTION, CLASSIC  2013     Section    KNEE ARTHROPLASTY  2013    Knee Arthroplasty    LUMBAR FUSION  2013    Lumbar Vertebral Fusion    OTHER SURGICAL HISTORY  2015    Breast Surgery Enlargement Procedure    OTHER SURGICAL HISTORY  2014    Breast Surgery Mastopexy    OTHER SURGICAL HISTORY  2014    Stapedectomy - Left Ear    SINUS SURGERY  2013    Sinus Surgery     Family History:  Family History   Problem Relation  Name Age of Onset    Heart failure Mother      Osteoarthritis Mother      Coronary artery disease Mother      Hypertension Mother      Hypertension Father      Hypertension Sister      Migraines Sister       Social History:  Social History     Socioeconomic History    Marital status:      Spouse name: Not on file    Number of children: Not on file    Years of education: Not on file    Highest education level: Not on file   Occupational History    Not on file   Tobacco Use    Smoking status: Never    Smokeless tobacco: Never   Substance and Sexual Activity    Alcohol use: Yes     Comment: < 2 per week    Drug use: Never    Sexual activity: Not on file   Other Topics Concern    Not on file   Social History Narrative    Not on file     Social Determinants of Health     Financial Resource Strain: Not on file   Food Insecurity: Not on file   Transportation Needs: Not on file   Physical Activity: Not on file   Stress: Not on file   Social Connections: Not on file   Intimate Partner Violence: Not on file   Housing Stability: Not on file       Medical Review Of Systems:  Denies SOB, chest pain , n/v/d, no falls,     Psychiatric Review Of Systems:  Psychiatric ROS - Adult  Anxiety: General Anxiety Disorder (BONNY)BONNY Behaviors: difficult to control worry, excessive anxiety/worry, and easily fatigued  Depression: energy and interest  Delirium: negative  Psychosis: negative  Christianne: negative  Safety Issues: none    Disordered Eating Symptoms:denies   Inattentive Symptoms: improved attention with adderall    Hyperactive/Impulsive Symptoms:n/a     Trauma Symptoms:n/a        There were no vitals taken for this visit.     LABS  Lab Results   Component Value Date    TSH 2.91 11/24/2023    HDL 61.9 11/24/2023      Lab Results   Component Value Date    CHOL 218 (H) 11/24/2023    CHOL 195 06/03/2023    CHOL 181 07/06/2022     Lab Results   Component Value Date    HDL 61.9 11/24/2023    HDL 56.2 06/03/2023    HDL 57.5 07/06/2022  "    Lab Results   Component Value Date    LDLCALC 130 (H) 11/24/2023     Lab Results   Component Value Date    TRIG 133 11/24/2023    TRIG 132 06/03/2023    TRIG 77 07/06/2022     No components found for: \"CHOLHDL\"     Objective   Mental Status Exam:  Mental Status Examination  General Appearance: Well groomed, appropriate eye contact.  Gait/Station: not examined   Speech: Normal rate, volume, prosody  Mood: fair, lacks interest/motivation chronically   Affect: Euthymic, full-range  Thought Process: Linear, goal directed  Thought Associations: No loosening of associations  Thought Content: normal  Perception: No perceptual abnormalities noted  Level of Consciousness: Alert  Orientation: Alert and oriented to person, place, time and situation  Attention and Concentration: Intact  Recent Memory: Intact as evidenced by ability to recall details from the past 24 hours   Remote Memory: Intact as evidenced by ability to recall previous medical issues   Executive function: Intact  Language: no deficits noted   Fund of knowledge: Good  Insight: intact   Judgment: intact         Assessment/Plan   67 y/o  female who presented 9/20/2021 as a transfer of care regarding management of bipolar disorder (I versus II) and dx of ADHD. She is doing well with a combination of lamictal, and paxil regarding mood stability and anxiety.  She has continued to take  Adderall . There has been no death wish or SI for years and no h/o psychosis. There is no h/o self harm. .     dx: Bipolar II versus I; ADHD; anxiety unspecified     1) continue lamictal 300 mg daily, ; continue  paxil  15 mg daily   2) continue adderall 30 mg qam per pcp  (consider longer acting agent as she does not remember midday dose)   3) reviewed available labs,  4) f/u 8 weeks   Patient Active Problem List   Diagnosis    Constipation    Abnormal glucose    Abnormal mammogram of left breast    Anemia    Anxiety    ADHD (attention deficit hyperactivity disorder)    " Benign essential hypertension    Bipolar mood disorder (CMS/[facility])    Depression    Daytime sleepiness    Dysphagia    GERD (gastroesophageal reflux disease)    Eyelash scantiness    Pyelonephritis    Somatic dysfunction of lower extremities    Somatic dysfunction of lumbar region    Somatic dysfunction of pelvic region    Somatic dysfunction of sacral region    Arthritis    Asymmetric SNHL (sensorineural hearing loss)    Chronic rhinitis    Snoring    Elevated fasting lipid profile    Hyperlipemia    Hematuria    Hip pain    Lichen sclerosus    Obstructive sleep apnea    Osteoarthritis of knee    Trigger finger    Medicare annual wellness visit, subsequent    High risk medication use    Vitamin D insufficiency    Hx of primary hypertension    Sacroiliitis, not elsewhere classified (CMS/[facility])           Review with patient: Treatment plan reviewed with the patient.  Medication risks/benefit reviewed with the patient  Psychiatric Risk Assessment  Violence Risk Assessment: none  Acute Risk of Harm to Others is Considered: low   Suicide Risk Assessment: age > 65 yrs old and current psychiatric illness  Protective Factors against Suicide: adherence to  treatment, employment, fear of social disapproval, fear of suicide, sense of responsibility toward family, social support/connectedness, and strong coping skills  Acute Risk of Harm to Self is Considered: low        Luz Marina Simpson, DO

## 2024-01-30 ENCOUNTER — PATIENT MESSAGE (OUTPATIENT)
Dept: PRIMARY CARE | Facility: CLINIC | Age: 69
End: 2024-01-30
Payer: MEDICARE

## 2024-01-30 DIAGNOSIS — N18.32 STAGE 3B CHRONIC KIDNEY DISEASE (MULTI): Primary | ICD-10-CM

## 2024-03-28 ENCOUNTER — LAB (OUTPATIENT)
Dept: LAB | Facility: LAB | Age: 69
End: 2024-03-28
Payer: MEDICARE

## 2024-03-28 DIAGNOSIS — N18.32 STAGE 3B CHRONIC KIDNEY DISEASE (MULTI): ICD-10-CM

## 2024-03-28 PROCEDURE — 80069 RENAL FUNCTION PANEL: CPT

## 2024-03-28 PROCEDURE — 36415 COLL VENOUS BLD VENIPUNCTURE: CPT

## 2024-03-29 LAB
ALBUMIN SERPL BCP-MCNC: 4.5 G/DL (ref 3.4–5)
ANION GAP SERPL CALC-SCNC: 19 MMOL/L (ref 10–20)
BUN SERPL-MCNC: 24 MG/DL (ref 6–23)
CALCIUM SERPL-MCNC: 10.3 MG/DL (ref 8.6–10.6)
CHLORIDE SERPL-SCNC: 103 MMOL/L (ref 98–107)
CO2 SERPL-SCNC: 23 MMOL/L (ref 21–32)
CREAT SERPL-MCNC: 1.23 MG/DL (ref 0.5–1.05)
EGFRCR SERPLBLD CKD-EPI 2021: 48 ML/MIN/1.73M*2
GLUCOSE SERPL-MCNC: 94 MG/DL (ref 74–99)
PHOSPHATE SERPL-MCNC: 3.6 MG/DL (ref 2.5–4.9)
POTASSIUM SERPL-SCNC: 5.1 MMOL/L (ref 3.5–5.3)
SODIUM SERPL-SCNC: 140 MMOL/L (ref 136–145)

## 2024-05-01 ENCOUNTER — OFFICE VISIT (OUTPATIENT)
Dept: NEPHROLOGY | Facility: CLINIC | Age: 69
End: 2024-05-01
Payer: MEDICARE

## 2024-05-01 VITALS
BODY MASS INDEX: 33.23 KG/M2 | HEIGHT: 61 IN | TEMPERATURE: 97.6 F | DIASTOLIC BLOOD PRESSURE: 76 MMHG | SYSTOLIC BLOOD PRESSURE: 122 MMHG | HEART RATE: 69 BPM | WEIGHT: 176 LBS

## 2024-05-01 DIAGNOSIS — N18.31 HYPERTENSIVE KIDNEY DISEASE WITH STAGE 3A CHRONIC KIDNEY DISEASE (MULTI): Primary | ICD-10-CM

## 2024-05-01 DIAGNOSIS — N18.32 STAGE 3B CHRONIC KIDNEY DISEASE (MULTI): ICD-10-CM

## 2024-05-01 DIAGNOSIS — E78.2 MIXED HYPERLIPIDEMIA: ICD-10-CM

## 2024-05-01 DIAGNOSIS — I12.9 HYPERTENSIVE KIDNEY DISEASE WITH STAGE 3A CHRONIC KIDNEY DISEASE (MULTI): Primary | ICD-10-CM

## 2024-05-01 PROCEDURE — 1160F RVW MEDS BY RX/DR IN RCRD: CPT | Performed by: INTERNAL MEDICINE

## 2024-05-01 PROCEDURE — 1036F TOBACCO NON-USER: CPT | Performed by: INTERNAL MEDICINE

## 2024-05-01 PROCEDURE — 1159F MED LIST DOCD IN RCRD: CPT | Performed by: INTERNAL MEDICINE

## 2024-05-01 PROCEDURE — 3074F SYST BP LT 130 MM HG: CPT | Performed by: INTERNAL MEDICINE

## 2024-05-01 PROCEDURE — 1123F ACP DISCUSS/DSCN MKR DOCD: CPT | Performed by: INTERNAL MEDICINE

## 2024-05-01 PROCEDURE — 3078F DIAST BP <80 MM HG: CPT | Performed by: INTERNAL MEDICINE

## 2024-05-01 PROCEDURE — 99213 OFFICE O/P EST LOW 20 MIN: CPT | Performed by: INTERNAL MEDICINE

## 2024-05-01 RX ORDER — NEBULIZER AND COMPRESSOR
1 EACH MISCELLANEOUS ONCE
Qty: 1 EACH | Refills: 1 | Status: SHIPPED | OUTPATIENT
Start: 2024-05-01 | End: 2024-05-01

## 2024-05-01 NOTE — PATIENT INSTRUCTIONS
Thank you for your visit!    Today we discussed:   Your kidney function is around 45-50%    Continue a low salt, DASH diet, no more than 2000 mg (2 g) sodium (salt) daily  Continue regular exercise, at least 20 minutes a day, as you can tolerate  Your goal blood pressure is 120s/70-80s; please follow your home blood pressure daily and bring in blood pressure measurements to next office visits=  Avoid kidney toxic medications, such as nonsteroidal anti-inflammatories (NSAIDs); Tylenol or acetaminophen over the counter is okay    Please keep your immunizations up to date    Labs and follow up in 6 months  Follow home blood pressures daily the week before clinic visits and get labs before clinic visits  Nutritionist please schedule  Ultrasound please schedule  Clinical pharmacist will call regarding SGLT2 medication we discussed    www.kidney.org ([employer] website) is a great source of information regarding kidney disease    Sincerely, Dr. Angel  Phone [phone]   Assistant Kimmie, [phone]

## 2024-05-01 NOTE — PROGRESS NOTES
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

## 2024-05-17 DIAGNOSIS — F31.70 BIPOLAR AFFECTIVE DISORDER IN REMISSION (MULTI): ICD-10-CM

## 2024-05-17 RX ORDER — LAMOTRIGINE 150 MG/1
300 TABLET ORAL DAILY
Qty: 180 TABLET | Refills: 0 | Status: SHIPPED | OUTPATIENT
Start: 2024-05-17 | End: 2024-08-15

## 2024-06-11 ENCOUNTER — APPOINTMENT (OUTPATIENT)
Dept: RADIOLOGY | Facility: CLINIC | Age: 69
End: 2024-06-11
Payer: MEDICARE

## 2024-06-12 ENCOUNTER — APPOINTMENT (OUTPATIENT)
Dept: PRIMARY CARE | Facility: CLINIC | Age: 69
End: 2024-06-12
Payer: MEDICARE

## 2024-06-12 VITALS — HEIGHT: 61 IN | BODY MASS INDEX: 33.25 KG/M2

## 2024-06-12 DIAGNOSIS — N18.32 STAGE 3B CHRONIC KIDNEY DISEASE (MULTI): ICD-10-CM

## 2024-06-12 DIAGNOSIS — E78.2 MIXED HYPERLIPIDEMIA: ICD-10-CM

## 2024-06-12 PROCEDURE — 97802 MEDICAL NUTRITION INDIV IN: CPT

## 2024-06-12 NOTE — PROGRESS NOTES
Nutrition: Initial Assessment    Reason for Nutrition Visit: Patient is a 68 y.o. female referred for HLD, CKD stage 3b (low salt, heart healthy diet). Referred on 5/1/24 by Dr. Celia Angel.       Nutrition Assessment    Food & Nutrition Related History: Pt presents in office. She does not like to cook. She eats out or eats ice cream for dinner. She does like most vegetables.     Dietary Considerations: None   Allergies: None  Intolerance: None  Appetite: Good  Intake: >75%  GI Symptoms : reflux   Swallowing Difficulty: No problems with swallowing  Dentition : own  Food Preparation: Patient  Cooking Skills/Barriers: None reported  Grocery Shopping: Patient  Supplements: Vitamin D and Magnesium daily  Food Insecurity: Denies     Dietary Recall:   Meal 1: toast with butter or peanut butter, tea   Meal 2: sandwich or yogurt with fruit   Meal 3: eat out or ice cream     Snacks: chips (salty foods)   Beverages: water, crystal light; has mostly given up pop   Eating out: frequent   Alcohol Intake: 1x per month   Self-Identified Challenges: does not like to cook     Physical Activity: mostly sedentary     Labs:  Lab Results   Component Value Date    HGBA1C 6.0 01/23/2019     03/28/2024    K 5.1 03/28/2024     03/28/2024    CO2 23 03/28/2024    BUN 24 (H) 03/28/2024    CREATININE 1.23 (H) 03/28/2024    CALCIUM 10.3 03/28/2024    ALBUMIN 4.5 03/28/2024    PROT 7.4 11/21/2023    BILITOT 0.4 11/21/2023    ALKPHOS 94 11/21/2023    ALT 10 11/21/2023    AST 11 11/21/2023    GLUCOSE 94 03/28/2024    CHOL 218 (H) 11/24/2023    TRIG 133 11/24/2023    HDL 61.9 11/24/2023   Comments: High BUN (24) and high creatinine (1.23) w/ eGFR = 48, K+ is on the higher end of normal limits at 5.1 (3/28/24). High LDL = 130 (11/24/23). Normal vitamin D though patient does have h/o deficiency (6/3/23). Last A1c was back in 2019 and found to be 6.0% which is suggestive of pre-DM.       Nutrition Focused Physical  "Exam:  Performed/Deferred: Deferred as pt visually appears well-nourished with no signs of malnutrition      Past Medical History:  Patient Active Problem List   Diagnosis    Constipation    Abnormal glucose    Abnormal mammogram of left breast    Anemia    Anxiety    ADHD (attention deficit hyperactivity disorder)    Benign essential hypertension    Bipolar mood disorder (Multi)    Depression    Daytime sleepiness    Dysphagia    GERD (gastroesophageal reflux disease)    Eyelash scantiness    Pyelonephritis    Somatic dysfunction of lower extremities    Somatic dysfunction of lumbar region    Somatic dysfunction of pelvic region    Somatic dysfunction of sacral region    Arthritis    Asymmetric SNHL (sensorineural hearing loss)    Chronic rhinitis    Snoring    Elevated fasting lipid profile    Hyperlipemia    Hematuria    Hip pain    Lichen sclerosus    Obstructive sleep apnea    Osteoarthritis of knee    Trigger finger    Medicare annual wellness visit, subsequent    High risk medication use    Vitamin D insufficiency    Hx of primary hypertension    Sacroiliitis, not elsewhere classified (CMS-HCC)        Anthropometrics:  Ht Readings from Last 1 Encounters:   06/12/24 1.549 m (5' 1\")     BMI Readings from Last 1 Encounters:   06/12/24 33.25 kg/m²     Wt Readings from Last 10 Encounters:   05/01/24 79.8 kg (176 lb)   12/21/23 79.8 kg (176 lb)   11/28/23 81.6 kg (179 lb 12.8 oz)   07/19/23 84.6 kg (186 lb 6.4 oz)   05/30/23 83.9 kg (185 lb)   12/15/22 86.2 kg (190 lb)   11/07/22 86.2 kg (190 lb)   05/23/22 85.3 kg (188 lb)   05/09/22 85.3 kg (188 lb 0.6 oz)   05/02/22 85.8 kg (189 lb 3.2 oz)     Weight change: Wt loss of 5% (10 lbs) x 10 months   Significant weight change: No    Estimated Nutrition Needs:    Total Energy Estimated Needs (kCal): 1500 kCal   Method for Estimating Needs: MSJ 1261 x 1.2   Total Protein Estimated Needs (g): 48 g   Total Protein Estimated Needs (g/kg): 0.6 g/kg    Nutrition Diagnosis "     Patient has Nutrition Diagnosis: Yes Diagnosis Status (1): New  Nutrition Diagnosis 1: Altered nutrition related to laboratory values Related to (1): CKD Stage 3 As Evidenced by (1): high BUN (24) and high creatinine (1.23) w/ eGFR = 48 (3/28/24)     Diagnosis Status (2): New Nutrition Diagnosis 2: Excessive mineral intake  Nutrition Diagnosis 2: Excessive mineral intake Related to (2): frequent consumption of convenience foods As Evidenced by (2): patient's 24 hr diet recall reflecting high sodium intake       Nutrition Interventions/Recommendations   FOOD & NUTRIENT DELIVERY: Kidney-Friendly Diet, Heart Healthy Diet, Low Sodium Diet, Low Saturated Fat, High Fiber Diet     NUTRITION EDUCATION:     Explained diet recommendations for chronic kidney disease:  Limit salt (sodium) in the diet. Sodium intake should be less than 2000 mg per day. When reading food labels, look for products with 5% or less Daily Value (DV) sodium. Avoid buying products with 20% of more DV of sodium. If a food contains between 5-20% DV sodium, then be careful with the portion size when eating it so that daily intake can remain within 2000 mg. If eating 3 meals per day, aim for each meal to have about 600-700 mg sodium at most.   Drink water to stay hydrated. Caffeinated fluids are not as hydrating compared to water. High sugar fluids should be avoided, especially for those with diabetes.  Potassium may need to be limited in the diet, depending on how much potassium is in the blood. Told the patient that their potassium has been normal and therefore focus efforts on reducing potassium additives. Potassium that is added by food manufacturers is more harmful than potassium that naturally occurs in fruits, vegetables, beans, nuts, seeds, and dairy products. The added potassium can be identified by reading labels for any ingredients that say “potassium”. A couple examples of potassium additives are potassium chloride and potassium sorbate.  The daily budget for potassium is 2000 mg. Foods that contain less than 200 mg potassium per serving are fine to eat on a low potassium diet. Try to eat fruits and vegetables in their whole form because the fiber in them helps to decrease the amount of potassium absorbed into the blood. The potassium that is found in fruits and vegetables in liquid form can be absorbed more easily into the blood (e.g., tomato soup, tomato juice, orange juice).   Phosphorus may need to be limited depending on how much phosphorus is in the blood. Told the patient that their phosphorus has been normal and therefore focus on reducing phosphorus additives. Phosphorus that is added by food manufacturers is more readily absorbed compared with phosphorus that is naturally occurring in foods, such as dairy products, nuts, seeds, whole grains. If phosphorus needs to be limited, then read labels to avoid foods with added phosphorus. These foods can be identified by looking for “phos” on the ingredient list. If phosphorus remains high despite avoiding the additives, then the total amount of dairy, nuts, seeds, beans, and whole grains may need to be limited. Your doctor may also prescribe phosphorus binders, which are medications that can help decrease the absorption of phosphorus from foods.   Protein is an important part of a healthy diet; however, too much protein is not good for kidney disease. Examples of proteins are chicken, turkey, beef, pork, fish, shellfish, eggs, cheese. Plant forms of protein include nuts, seeds, beans, and soy. Aim to limit protein to about ¼ of the plate, or about the size of a deck of cards.     Discussed using the following website for additional education on kidney disease and for kidney-friendly cookbooks: [employer]/diet-nutrition.     Educational Handouts: MNT for CKD Stages 3-5    *Patient expressed understanding of the education provided and denied any additional questions/concerns.       Nutrition  Monitoring and Evaluation   Consistent meal/snack pattern   Reduce sodium less than 2000 mg   Reduce LDL less than 100 mg/dL  Reduce A1c < 5.%    Readiness to Change : Good  Level of Understanding : Good  Anticipated Compliant : Good     Follow-up: Patient is welcome to follow-up at any time. To schedule, please call [phone].

## 2024-06-17 NOTE — PROGRESS NOTES
Subjective   Patient ID: Elsy Molina is a 68 y.o. female who presents for CKD/sglt2    Referring Provider: Celia WESTBROOK  HPI: CKD stage 3a. last EGFR 48 sCr 1.23  HTN:  /76 at last ov  Medications  Lisinopril 20mg every day  Hydrochlorothiazide 25mg qd    glucose: controlled and monitored  A1C 6.0  Medications    HLD:    On statin? Atorvastatin 20mg qd    Medications reviewed for appropriate dosing in setting of CKD  Recommended changes:  - no adjustments needed at this time      Objective     There were no vitals taken for this visit.     Labs  Lab Results   Component Value Date    BILITOT 0.4 11/21/2023    CALCIUM 10.3 03/28/2024    CO2 23 03/28/2024     03/28/2024    CREATININE 1.23 (H) 03/28/2024    GLUCOSE 94 03/28/2024    ALKPHOS 94 11/21/2023    K 5.1 03/28/2024    PROT 7.4 11/21/2023     03/28/2024    AST 11 11/21/2023    ALT 10 11/21/2023    BUN 24 (H) 03/28/2024    ANIONGAP 19 03/28/2024    PHOS 3.6 03/28/2024    ALBUMIN 4.5 03/28/2024    GFRF 46 (A) 07/19/2023     Lab Results   Component Value Date    TRIG 133 11/24/2023    CHOL 218 (H) 11/24/2023    LDLCALC 130 (H) 11/24/2023    HDL 61.9 11/24/2023     Lab Results   Component Value Date    HGBA1C 6.0 01/23/2019       Current Outpatient Medications on File Prior to Visit   Medication Sig Dispense Refill    amphetamine-dextroamphetamine (Adderall) 30 mg tablet Take 1 tablet (30 mg) by mouth 2 times daily after breakfast and lunch. 60 tablet 0    amphetamine-dextroamphetamine (Adderall) 30 mg tablet Take 1 tablet (30 mg) by mouth 2 times daily after breakfast and lunch. Do not start before December 28, 2023. (Patient not taking: Reported on 5/1/2024) 60 tablet 0    amphetamine-dextroamphetamine (Adderall) 30 mg tablet Take 1 tablet (30 mg) by mouth 2 times daily after breakfast and lunch. Do not start before January 27, 2024. (Patient not taking: Reported on 5/1/2024) 60 tablet 0    atorvastatin (Lipitor) 20 mg tablet Take  1 tablet (20 mg) by mouth once daily. 30 tablet 5    gabapentin (Neurontin) 300 mg capsule Take 1 capsule (300 mg) by mouth 2 times a day.      hydroCHLOROthiazide (HYDRODiuril) 25 mg tablet Take 1 tablet (25 mg) by mouth once daily. 90 tablet 1    lamoTRIgine (LaMICtal) 150 mg tablet Take 2 tablets (300 mg) by mouth once daily. 180 tablet 0    lisinopril 20 mg tablet Take 1 tablet (20 mg) by mouth once daily. 90 tablet 1    meclizine (Antivert) 25 mg tablet Take 1 tablet (25 mg) by mouth 3 times a day as needed for dizziness. (Patient not taking: Reported on 5/1/2024) 30 tablet 1    pantoprazole (ProtoNix) 40 mg EC tablet Take 1 tablet (40 mg) by mouth once daily in the morning. Take before meals. 90 tablet 3    PARoxetine (Paxil) 30 mg tablet Take 0.5 tablets (15 mg) by mouth once daily. 45 tablet 1     No current facility-administered medications on file prior to visit.        Assessment/Plan   PATIENT EDUCATION/DISCUSSION:  - Counseled patient on MOA, expectations, side effects, duration of therapy, contraindications, administration, and monitoring parameters  - Answered all patient questions and concerns  - $47 farxiga 5mg    _______________________________________________________________________  PLAN  1. START farxiga 5mg qd  2. [facility] 30day    Mejia Iglesias, PharmD    Continue all meds under the continuation of care with the referring provider and clinical pharmacy team.

## 2024-06-18 ENCOUNTER — HOSPITAL ENCOUNTER (OUTPATIENT)
Dept: RADIOLOGY | Facility: CLINIC | Age: 69
Discharge: HOME | End: 2024-06-18
Payer: MEDICARE

## 2024-06-18 ENCOUNTER — APPOINTMENT (OUTPATIENT)
Dept: PHARMACY | Facility: HOSPITAL | Age: 69
End: 2024-06-18
Payer: MEDICARE

## 2024-06-18 VITALS — BODY MASS INDEX: 33.99 KG/M2 | WEIGHT: 180 LBS | HEIGHT: 61 IN

## 2024-06-18 DIAGNOSIS — Z12.31 SCREENING MAMMOGRAM FOR BREAST CANCER: ICD-10-CM

## 2024-06-18 DIAGNOSIS — E78.2 MIXED HYPERLIPIDEMIA: ICD-10-CM

## 2024-06-18 DIAGNOSIS — N18.32 STAGE 3B CHRONIC KIDNEY DISEASE (MULTI): ICD-10-CM

## 2024-06-18 DIAGNOSIS — N18.31 STAGE 3A CHRONIC KIDNEY DISEASE (MULTI): Primary | ICD-10-CM

## 2024-06-18 PROCEDURE — 77063 BREAST TOMOSYNTHESIS BI: CPT | Performed by: STUDENT IN AN ORGANIZED HEALTH CARE EDUCATION/TRAINING PROGRAM

## 2024-06-18 PROCEDURE — 76770 US EXAM ABDO BACK WALL COMP: CPT

## 2024-06-18 PROCEDURE — 77067 SCR MAMMO BI INCL CAD: CPT | Performed by: STUDENT IN AN ORGANIZED HEALTH CARE EDUCATION/TRAINING PROGRAM

## 2024-06-18 PROCEDURE — 76770 US EXAM ABDO BACK WALL COMP: CPT | Performed by: RADIOLOGY

## 2024-06-18 PROCEDURE — 77067 SCR MAMMO BI INCL CAD: CPT

## 2024-06-18 RX ORDER — DAPAGLIFLOZIN 5 MG/1
5 TABLET, FILM COATED ORAL DAILY
Qty: 30 TABLET | Refills: 0 | Status: SHIPPED | OUTPATIENT
Start: 2024-06-18 | End: 2024-07-18

## 2024-06-24 ENCOUNTER — APPOINTMENT (OUTPATIENT)
Dept: BEHAVIORAL HEALTH | Facility: CLINIC | Age: 69
End: 2024-06-24
Payer: MEDICARE

## 2024-07-16 ENCOUNTER — APPOINTMENT (OUTPATIENT)
Dept: PHARMACY | Facility: HOSPITAL | Age: 69
End: 2024-07-16
Payer: MEDICARE

## 2024-08-16 ENCOUNTER — TELEPHONE (OUTPATIENT)
Dept: PRIMARY CARE | Facility: CLINIC | Age: 69
End: 2024-08-16
Payer: MEDICARE

## 2024-09-06 NOTE — PROGRESS NOTES
Subjective   Patient ID: Elsy Molina is a 69 y.o. female who presents for CKD/sglt2    Referring Provider: Celia Angel    HPI  HPI: CKD stage 3a. last EGFR 48 sCr 1.23  Was on farxiga for about a month was tolerating well no issues with dizziness/lightheadedness. No issues with painful/difficult urination. Needed to reschedule follow up with me and ran out of refills so has now been off for 2 months. Agreeable to restart at 5mg every day. Encouraged to call  me if any need for refills or issues with needing to reschedule appointments.  HTN:  /76 at last ov  Medications  Lisinopril 20mg every day  Hydrochlorothiazide 25mg qd    glucose: controlled and monitored  A1C 6.0  Medications    HLD:    On statin? Atorvastatin 20mg qd    Medications reviewed for appropriate dosing in setting of CKD  Recommended changes:  - no adjustments needed at this time      Objective     There were no vitals taken for this visit.     Labs  Lab Results   Component Value Date    BILITOT 0.4 11/21/2023    CALCIUM 10.3 03/28/2024    CO2 23 03/28/2024     03/28/2024    CREATININE 1.23 (H) 03/28/2024    GLUCOSE 94 03/28/2024    ALKPHOS 94 11/21/2023    K 5.1 03/28/2024    PROT 7.4 11/21/2023     03/28/2024    AST 11 11/21/2023    ALT 10 11/21/2023    BUN 24 (H) 03/28/2024    ANIONGAP 19 03/28/2024    PHOS 3.6 03/28/2024    ALBUMIN 4.5 03/28/2024    GFRF 46 (A) 07/19/2023     Lab Results   Component Value Date    TRIG 133 11/24/2023    CHOL 218 (H) 11/24/2023    LDLCALC 130 (H) 11/24/2023    HDL 61.9 11/24/2023     Lab Results   Component Value Date    HGBA1C 6.0 01/23/2019       Current Outpatient Medications on File Prior to Visit   Medication Sig Dispense Refill    amphetamine-dextroamphetamine (Adderall) 30 mg tablet Take 1 tablet (30 mg) by mouth 2 times daily after breakfast and lunch. 60 tablet 0    amphetamine-dextroamphetamine (Adderall) 30 mg tablet Take 1 tablet (30 mg) by mouth 2 times daily after  breakfast and lunch. Do not start before December 28, 2023. (Patient not taking: Reported on 5/1/2024) 60 tablet 0    amphetamine-dextroamphetamine (Adderall) 30 mg tablet Take 1 tablet (30 mg) by mouth 2 times daily after breakfast and lunch. Do not start before January 27, 2024. (Patient not taking: Reported on 5/1/2024) 60 tablet 0    atorvastatin (Lipitor) 20 mg tablet Take 1 tablet (20 mg) by mouth once daily. 30 tablet 5    Farxiga 5 mg Take 1 tablet (5 mg) by mouth once daily. TAKE ONE TABLET BY MOUTH ONCE DAILY IN THE MORNING 30 tablet 0    gabapentin (Neurontin) 300 mg capsule Take 1 capsule (300 mg) by mouth 2 times a day.      hydroCHLOROthiazide (HYDRODiuril) 25 mg tablet Take 1 tablet (25 mg) by mouth once daily. 90 tablet 1    lamoTRIgine (LaMICtal) 150 mg tablet Take 2 tablets (300 mg) by mouth once daily. 180 tablet 0    lisinopril 20 mg tablet Take 1 tablet (20 mg) by mouth once daily. 90 tablet 1    meclizine (Antivert) 25 mg tablet Take 1 tablet (25 mg) by mouth 3 times a day as needed for dizziness. (Patient not taking: Reported on 5/1/2024) 30 tablet 1    pantoprazole (ProtoNix) 40 mg EC tablet Take 1 tablet (40 mg) by mouth once daily in the morning. Take before meals. 90 tablet 3    PARoxetine (Paxil) 30 mg tablet Take 0.5 tablets (15 mg) by mouth once daily. 45 tablet 1     No current facility-administered medications on file prior to visit.        Assessment/Plan   PATIENT EDUCATION/DISCUSSION:  - Counseled patient on MOA, expectations, side effects, duration of therapy, contraindications, administration, and monitoring parameters  - Answered all patient questions and concerns  - $47 farxiga 5mg    - follow up 10/14 @ 1230a  _______________________________________________________________________  PLAN  1. RESTART farxiga 5mg qd  2. [facility] 30day    Mejia Iglesias, PharmD    Continue all meds under the continuation of care with the referring provider and clinical pharmacy team.

## 2024-09-09 ENCOUNTER — APPOINTMENT (OUTPATIENT)
Dept: PHARMACY | Facility: HOSPITAL | Age: 69
End: 2024-09-09
Payer: MEDICARE

## 2024-09-09 DIAGNOSIS — N18.31 STAGE 3A CHRONIC KIDNEY DISEASE (MULTI): ICD-10-CM

## 2024-09-09 RX ORDER — DAPAGLIFLOZIN 5 MG/1
5 TABLET, FILM COATED ORAL DAILY
Qty: 30 TABLET | Refills: 2 | Status: SHIPPED | OUTPATIENT
Start: 2024-09-09 | End: 2024-12-08

## 2024-09-16 ENCOUNTER — APPOINTMENT (OUTPATIENT)
Dept: BEHAVIORAL HEALTH | Facility: CLINIC | Age: 69
End: 2024-09-16
Payer: MEDICARE

## 2024-09-16 DIAGNOSIS — F41.9 ANXIETY: ICD-10-CM

## 2024-09-16 DIAGNOSIS — F33.41 RECURRENT MAJOR DEPRESSIVE DISORDER, IN PARTIAL REMISSION (CMS-HCC): ICD-10-CM

## 2024-09-16 DIAGNOSIS — F90.0 ATTENTION DEFICIT HYPERACTIVITY DISORDER (ADHD), PREDOMINANTLY INATTENTIVE TYPE: ICD-10-CM

## 2024-09-16 DIAGNOSIS — F31.70 BIPOLAR AFFECTIVE DISORDER IN REMISSION (MULTI): ICD-10-CM

## 2024-09-16 PROCEDURE — 1160F RVW MEDS BY RX/DR IN RCRD: CPT | Performed by: PSYCHIATRY & NEUROLOGY

## 2024-09-16 PROCEDURE — 99214 OFFICE O/P EST MOD 30 MIN: CPT | Performed by: PSYCHIATRY & NEUROLOGY

## 2024-09-16 PROCEDURE — 1159F MED LIST DOCD IN RCRD: CPT | Performed by: PSYCHIATRY & NEUROLOGY

## 2024-09-16 PROCEDURE — 1036F TOBACCO NON-USER: CPT | Performed by: PSYCHIATRY & NEUROLOGY

## 2024-09-16 PROCEDURE — 1123F ACP DISCUSS/DSCN MKR DOCD: CPT | Performed by: PSYCHIATRY & NEUROLOGY

## 2024-09-16 RX ORDER — PAROXETINE 30 MG/1
15 TABLET, FILM COATED ORAL DAILY
Qty: 45 TABLET | Refills: 1 | Status: SHIPPED | OUTPATIENT
Start: 2024-09-16 | End: 2025-03-15

## 2024-09-16 RX ORDER — LAMOTRIGINE 150 MG/1
300 TABLET ORAL DAILY
Qty: 180 TABLET | Refills: 0 | Status: SHIPPED | OUTPATIENT
Start: 2024-09-16 | End: 2024-12-15

## 2024-09-16 RX ORDER — BUPROPION HYDROCHLORIDE 100 MG/1
100 TABLET, EXTENDED RELEASE ORAL DAILY
Qty: 30 TABLET | Refills: 1 | Status: SHIPPED | OUTPATIENT
Start: 2024-09-16 | End: 2024-11-15

## 2024-09-16 NOTE — PATIENT INSTRUCTIONS
-continue lamictal 300 mg daily and paxil 15 mg daily   -start wellbutrin  mg in the am,  monitor for any increase in blood pressure when taking wellbutrin  -I will see you in about 4 weeks  -call [phone] with questions or concerns   -call nephrology to be placed on the waiting list for cancellations for any available nephrologist  -call your primary care physician to discuss hydrochlorothiazide use in the setting of recent decline in renal function     -If there are any thoughts of harming your self, harming others, concerning worsening of your mental health symptoms or concerning medication side effects, please call [phone] or reports to the nearest emergency room.

## 2024-09-16 NOTE — PROGRESS NOTES
"Outpatient Psychiatry          Reason for Visit: routine follow up for Bipolar II versus I; ADHD; anxiety unspecified. \"I am doing good\"          HPI:70 y/o female who presented 9/20/2021 for management of ADHD and bipolar II. She reports that is mood is stable, but not very motivated. She went off her ADHD medication due to renal issues (increase her blood pressure) . She is living close to kids and her sister. Anxiety level is generally manageable, but now that she has been taken off of ADHD meds she feels anxious about what she is not getting done.    Denies difficulty with social anxiety. Notes there is chronic level of low motivation, feels chronically less happy than others, not very excited about socializing. She does see family but often does not feel like going out (she has always been this was and never fostered many new friendships). lazy.   Not sad, no asia   No death wish or SI.      Told she has decline in renal function - has nephrologist recommended stopping stimulants due to renal impact.    She recalls depression beginning by her teen years. Depression is chronic and never seems to fully resolve. Depressive exacerbations consist of tearfulness, lacks motivation/no energy, isolates, cancels appointments, isolates, irritable, increased sleep, lacks self confidence . There has been no death wish or SI for years. No hallucinations, no paranoia. Elevated moods have consisted of impulsive behavior (resulted in divorce, met with men she knew nothing about), spends a great deal of money, excited, goes off on tangents when speaking, starting many projects, hyper, - she recalls three episodes of highly impulsive behavior/out of character that lasted for a few months.   Denies excessive chronic daily worry but can be ruminative, no panic attack, no compulsions.   She has been dx with ADHD by some sort of testing. She recalls being easily frustrated during school, but does not recall having specific " difficulties paying attention during school. She thinks Adderall helps her focus , stay on task. . The last adderall rx was 2/2021. historically is has been hard to concentrate at work as she thinks of other things prior to finishing what she is doing.       PAST PSYCH H/O: bipolar II dx 4 years ago, dx ADHD dx around 2014. First mental health treatment by pcp with anti depressants around her mid 30's and she has remained on meds since that time. She has been in counseling in the past.   PAST PSYCH HOSP: denied  SELF HARM: none , but had SI in her 30's and 40's  PAST PSYCH MEDS: adderall, many antidepressants- cymbalta, prozac, paxil, effexor, wellbutrin, lexapro, zoloft. lamcital,     SUBSTANCE USE H/O: no h/o tobacco/nicotine, drinks wine 1-2 times per week - about 1 glass, denies drug use    FAMILY PSYCH H/O: sisters- depression, mom - depression, niece- suicided 1 year ago (26 y/o)     PAST MED H/O: OA, GERD   ALL: NKDA       SOCIAL H/O: lives alone,  X 1, 3 grown children who she is. close with , close with her 2 sisters. 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 denies victimization h/o        Current Medications:    Current Outpatient Medications:     amphetamine-dextroamphetamine (Adderall) 30 mg tablet, Take 1 tablet (30 mg) by mouth 2 times daily after breakfast and lunch., Disp: 60 tablet, Rfl: 0    amphetamine-dextroamphetamine (Adderall) 30 mg tablet, Take 1 tablet (30 mg) by mouth 2 times daily after breakfast and lunch. Do not start before December 28, 2023. (Patient not taking: Reported on 5/1/2024), Disp: 60 tablet, Rfl: 0    amphetamine-dextroamphetamine (Adderall) 30 mg tablet, Take 1 tablet (30 mg) by mouth 2 times daily after breakfast and lunch. Do not start before January 27, 2024. (Patient not taking: Reported on 5/1/2024), Disp: 60 tablet, Rfl: 0    atorvastatin (Lipitor) 20 mg tablet, Take 1  tablet (20 mg) by mouth once daily., Disp: 30 tablet, Rfl: 5    Farxiga 5 mg, Take 1 tablet (5 mg) by mouth once daily. TAKE ONE TABLET BY MOUTH ONCE DAILY IN THE MORNING, Disp: 30 tablet, Rfl: 2    gabapentin (Neurontin) 300 mg capsule, Take 1 capsule (300 mg) by mouth 2 times a day., Disp: , Rfl:     hydroCHLOROthiazide (HYDRODiuril) 25 mg tablet, Take 1 tablet (25 mg) by mouth once daily., Disp: 90 tablet, Rfl: 1    lamoTRIgine (LaMICtal) 150 mg tablet, Take 2 tablets (300 mg) by mouth once daily., Disp: 180 tablet, Rfl: 0    lisinopril 20 mg tablet, Take 1 tablet (20 mg) by mouth once daily., Disp: 90 tablet, Rfl: 1    meclizine (Antivert) 25 mg tablet, Take 1 tablet (25 mg) by mouth 3 times a day as needed for dizziness. (Patient not taking: Reported on 2024), Disp: 30 tablet, Rfl: 1    pantoprazole (ProtoNix) 40 mg EC tablet, Take 1 tablet (40 mg) by mouth once daily in the morning. Take before meals., Disp: 90 tablet, Rfl: 3    PARoxetine (Paxil) 30 mg tablet, Take 0.5 tablets (15 mg) by mouth once daily., Disp: 45 tablet, Rfl: 1  Medical History:  Past Medical History:   Diagnosis Date    Chronic rhinitis 2014    Chronic rhinitis    Encounter for screening for malignant neoplasm of colon 2019    Colon cancer screening    Fever, unspecified 2015    Fever of unknown origin    Personal history of other diseases of the digestive system     History of esophageal reflux    Spinal stenosis, site unspecified     Spinal stenosis     Surgical History:  Past Surgical History:   Procedure Laterality Date    BELT ABDOMINOPLASTY  2014    Abdominoplasty     SECTION, CLASSIC  2013     Section    KNEE ARTHROPLASTY  2013    Knee Arthroplasty    LUMBAR FUSION  2013    Lumbar Vertebral Fusion    OTHER SURGICAL HISTORY  2015    Breast Surgery Enlargement Procedure    OTHER SURGICAL HISTORY  2014    Breast Surgery Mastopexy    OTHER SURGICAL HISTORY   08/07/2014    Stapedectomy - Left Ear    SINUS SURGERY  03/29/2013    Sinus Surgery     Family History:  Family History   Problem Relation Name Age of Onset    Heart failure Mother      Osteoarthritis Mother      Coronary artery disease Mother      Hypertension Mother      Hypertension Father      Hypertension Sister      Migraines Sister       Social History:  Social History     Socioeconomic History    Marital status:      Spouse name: Not on file    Number of children: Not on file    Years of education: Not on file    Highest education level: Not on file   Occupational History    Not on file   Tobacco Use    Smoking status: Never    Smokeless tobacco: Never   Substance and Sexual Activity    Alcohol use: Yes     Comment: < 2 per week    Drug use: Never    Sexual activity: Not on file   Other Topics Concern    Not on file   Social History Narrative    Not on file     Social Determinants of Health     Financial Resource Strain: Not on file   Food Insecurity: Not on file   Transportation Needs: Not on file   Physical Activity: Not on file   Stress: Not on file   Social Connections: Not on file   Intimate Partner Violence: Not on file   Housing Stability: Not on file       Medical Review Of Systems:  Denies SOB, chest pain , n/v/d, no falls,     Psychiatric Review Of Systems:  Psychiatric ROS - Adult  Anxiety: General Anxiety Disorder (BONNY)BONNY Behaviors: difficult to control worry, excessive anxiety/worry, and easily fatigued  Depression: energy and interest  Delirium: negative  Psychosis: negative  Christianne: negative  Safety Issues: none    Disordered Eating Symptoms:denies   Inattentive Symptoms: improved attention with adderall    Hyperactive/Impulsive Symptoms:n/a     Trauma Symptoms:n/a        There were no vitals taken for this visit.     LABS  Lab Results   Component Value Date    TSH 2.91 11/24/2023    HDL 61.9 11/24/2023      Lab Results   Component Value Date    CHOL 218 (H) 11/24/2023    CHOL 195  "06/03/2023    CHOL 181 07/06/2022     Lab Results   Component Value Date    HDL 61.9 11/24/2023    HDL 56.2 06/03/2023    HDL 57.5 07/06/2022     Lab Results   Component Value Date    LDLCALC 130 (H) 11/24/2023     Lab Results   Component Value Date    TRIG 133 11/24/2023    TRIG 132 06/03/2023    TRIG 77 07/06/2022     No components found for: \"CHOLHDL\"     Objective   Mental Status Exam:  Mental Status Examination  General Appearance: Well groomed, appropriate eye contact.  Gait/Station: not examined   Speech: Normal rate, volume, prosody  Mood: fair, lacks interest/motivation chronically   Affect: Euthymic, full-range  Thought Process: Linear, goal directed  Thought Associations: No loosening of associations  Thought Content: normal  Perception: No perceptual abnormalities noted  Level of Consciousness: Alert  Orientation: Alert and oriented to person, place, time and situation  Attention and Concentration: Intact  Recent Memory: Intact as evidenced by ability to recall details from the past 24 hours   Remote Memory: Intact as evidenced by ability to recall previous medical issues   Executive function: Intact  Language: no deficits noted   Fund of knowledge: Good  Insight: intact   Judgment: intact         Assessment/Plan   68 y/o  female who presented 9/20/2021 as a transfer of care regarding management of bipolar disorder (I versus II) and dx of ADHD. She is doing well with a combination of lamictal, and paxil regarding mood stability and anxiety.  She has stopped  Adderall  related to renal issues and has noted decrease productivity, not caring for house as she did. She can no longer take stimulants and wonders if there is an alternative.  We have agreed to a trial of wellbutrin. There has been no death wish or SI for years and no h/o psychosis. There is no h/o self harm. .      dx: Bipolar II versus I; ADHD; anxiety unspecified      1) continue lamictal 300 mg daily, ; continue  paxil  15 mg daily "   2) trial wellbutrin  mg in the am - she will check blood pressure to be sure that this does not result in elevated blood pressure   3) reviewed available labs,  4) f/u 4 weeks     Reviewed r/b/a of medications, side effects reviewed, patient gives informed consent for each medication. Patient understands to report to the er/urgent care if there are concerning medication side effects.          Patient Active Problem List   Diagnosis    Constipation    Abnormal glucose    Abnormal mammogram of left breast    Anemia    Anxiety    ADHD (attention deficit hyperactivity disorder)    Benign essential hypertension    Bipolar mood disorder (Multi)    Depression    Daytime sleepiness    Dysphagia    GERD (gastroesophageal reflux disease)    Eyelash scantiness    Pyelonephritis    Somatic dysfunction of lower extremities    Somatic dysfunction of lumbar region    Somatic dysfunction of pelvic region    Somatic dysfunction of sacral region    Arthritis    Asymmetric SNHL (sensorineural hearing loss)    Chronic rhinitis    Snoring    Elevated fasting lipid profile    Hyperlipemia    Hematuria    Hip pain    Lichen sclerosus    Obstructive sleep apnea    Osteoarthritis of knee    Trigger finger    Medicare annual wellness visit, subsequent    High risk medication use    Vitamin D insufficiency    Hx of primary hypertension    Sacroiliitis, not elsewhere classified (CMS-HCC)           Review with patient: Treatment plan reviewed with the patient.  Medication risks/benefit reviewed with the patient  Psychiatric Risk Assessment  Violence Risk Assessment: none  Acute Risk of Harm to Others is Considered: low   Suicide Risk Assessment: age > 65 yrs old and current psychiatric illness  Protective Factors against Suicide: adherence to  treatment, employment, fear of social disapproval, fear of suicide, sense of responsibility toward family, social support/connectedness, and strong coping skills  Acute Risk of Harm to Self is  Considered: damaso Simpson, DO

## 2024-10-10 ENCOUNTER — TELEPHONE (OUTPATIENT)
Dept: PRIMARY CARE | Facility: CLINIC | Age: 69
End: 2024-10-10
Payer: MEDICARE

## 2024-10-14 ENCOUNTER — APPOINTMENT (OUTPATIENT)
Dept: PHARMACY | Facility: HOSPITAL | Age: 69
End: 2024-10-14
Payer: MEDICARE

## 2024-10-14 DIAGNOSIS — N18.31 STAGE 3A CHRONIC KIDNEY DISEASE (MULTI): Primary | ICD-10-CM

## 2024-10-14 RX ORDER — DAPAGLIFLOZIN 10 MG/1
10 TABLET, FILM COATED ORAL DAILY
Qty: 90 TABLET | Refills: 3 | Status: SHIPPED | OUTPATIENT
Start: 2024-10-14 | End: 2025-10-09

## 2024-10-14 NOTE — PROGRESS NOTES
Subjective   Patient ID: Elsy Molina is a 69 y.o. female who presents for CKD/sglt2    Referring Provider: Celia Angel    HPI  HPI: CKD stage 3a. last EGFR 48 sCr 1.23  Back on farxiga for about a month  tolerating well no issues with dizziness/lightheadedness. No issues with painful/difficult urination. Agreeable to increase to target dose of 10mg. Will send to [facility]. Follow up with me 11/18  HTN:  /76 at last ov  Medications  Lisinopril 20mg every day  Hydrochlorothiazide 25mg qd    glucose: controlled and monitored  A1C 6.0  Medications    HLD:    On statin? Atorvastatin 20mg qd    Medications reviewed for appropriate dosing in setting of CKD  Recommended changes:  - no adjustments needed at this time      Objective     There were no vitals taken for this visit.     Labs  Lab Results   Component Value Date    BILITOT 0.4 11/21/2023    CALCIUM 10.3 03/28/2024    CO2 23 03/28/2024     03/28/2024    CREATININE 1.23 (H) 03/28/2024    GLUCOSE 94 03/28/2024    ALKPHOS 94 11/21/2023    K 5.1 03/28/2024    PROT 7.4 11/21/2023     03/28/2024    AST 11 11/21/2023    ALT 10 11/21/2023    BUN 24 (H) 03/28/2024    ANIONGAP 19 03/28/2024    PHOS 3.6 03/28/2024    ALBUMIN 4.5 03/28/2024    GFRF 46 (A) 07/19/2023     Lab Results   Component Value Date    TRIG 133 11/24/2023    CHOL 218 (H) 11/24/2023    LDLCALC 130 (H) 11/24/2023    HDL 61.9 11/24/2023     Lab Results   Component Value Date    HGBA1C 6.0 01/23/2019       Current Outpatient Medications on File Prior to Visit   Medication Sig Dispense Refill    atorvastatin (Lipitor) 20 mg tablet Take 1 tablet (20 mg) by mouth once daily. 30 tablet 5    buPROPion SR (Wellbutrin SR) 100 mg 12 hr tablet Take 1 tablet (100 mg) by mouth early in the morning.. Do not crush, chew, or split. 30 tablet 1    Farxiga 5 mg Take 1 tablet (5 mg) by mouth once daily. TAKE ONE TABLET BY MOUTH ONCE DAILY IN THE MORNING 30 tablet 2    hydroCHLOROthiazide (HYDRODiuril)  25 mg tablet Take 1 tablet (25 mg) by mouth once daily. 90 tablet 1    lamoTRIgine (LaMICtal) 150 mg tablet Take 2 tablets (300 mg) by mouth once daily. 180 tablet 0    lisinopril 20 mg tablet Take 1 tablet (20 mg) by mouth once daily. 90 tablet 1    pantoprazole (ProtoNix) 40 mg EC tablet Take 1 tablet (40 mg) by mouth once daily in the morning. Take before meals. 90 tablet 3    PARoxetine (Paxil) 30 mg tablet Take 0.5 tablets (15 mg) by mouth once daily. 45 tablet 1     No current facility-administered medications on file prior to visit.        Assessment/Plan   PATIENT EDUCATION/DISCUSSION:  - Counseled patient on MOA, expectations, side effects, duration of therapy, contraindications, administration, and monitoring parameters  - Answered all patient questions and concerns  - $47 farxiga   - follow up 11/18 @ 1230a  _______________________________________________________________________  PLAN  1. INCREASE farxiga 10mg qd  2. [employer] 90day    Mejia Iglesias, PharmD    Continue all meds under the continuation of care with the referring provider and clinical pharmacy team.

## 2024-10-31 ENCOUNTER — PATIENT MESSAGE (OUTPATIENT)
Dept: PRIMARY CARE | Facility: CLINIC | Age: 69
End: 2024-10-31
Payer: MEDICARE

## 2024-10-31 DIAGNOSIS — E55.9 VITAMIN D DEFICIENCY: Primary | ICD-10-CM

## 2024-10-31 DIAGNOSIS — E78.2 MIXED HYPERLIPIDEMIA: ICD-10-CM

## 2024-10-31 DIAGNOSIS — R73.09 ABNORMAL GLUCOSE: ICD-10-CM

## 2024-10-31 DIAGNOSIS — I10 BENIGN ESSENTIAL HYPERTENSION: ICD-10-CM

## 2024-10-31 DIAGNOSIS — R53.83 OTHER FATIGUE: ICD-10-CM

## 2024-11-05 ENCOUNTER — APPOINTMENT (OUTPATIENT)
Dept: NEPHROLOGY | Facility: CLINIC | Age: 69
End: 2024-11-05
Payer: MEDICARE

## 2024-11-11 ENCOUNTER — LAB (OUTPATIENT)
Dept: LAB | Facility: LAB | Age: 69
End: 2024-11-11
Payer: MEDICARE

## 2024-11-11 DIAGNOSIS — E78.2 MIXED HYPERLIPIDEMIA: ICD-10-CM

## 2024-11-11 DIAGNOSIS — N18.32 STAGE 3B CHRONIC KIDNEY DISEASE (MULTI): ICD-10-CM

## 2024-11-11 LAB
ALBUMIN SERPL BCP-MCNC: 4.3 G/DL (ref 3.4–5)
ANION GAP SERPL CALC-SCNC: 17 MMOL/L (ref 10–20)
APPEARANCE UR: CLEAR
BILIRUB UR STRIP.AUTO-MCNC: NEGATIVE MG/DL
BUN SERPL-MCNC: 31 MG/DL (ref 6–23)
CALCIUM SERPL-MCNC: 10 MG/DL (ref 8.6–10.6)
CHLORIDE SERPL-SCNC: 103 MMOL/L (ref 98–107)
CO2 SERPL-SCNC: 23 MMOL/L (ref 21–32)
COLOR UR: ABNORMAL
CREAT SERPL-MCNC: 1.56 MG/DL (ref 0.5–1.05)
CREAT UR-MCNC: 86 MG/DL (ref 20–320)
CREAT UR-MCNC: 86 MG/DL (ref 20–320)
EGFRCR SERPLBLD CKD-EPI 2021: 36 ML/MIN/1.73M*2
ERYTHROCYTE [DISTWIDTH] IN BLOOD BY AUTOMATED COUNT: 13.1 % (ref 11.5–14.5)
GLUCOSE SERPL-MCNC: 130 MG/DL (ref 74–99)
GLUCOSE UR STRIP.AUTO-MCNC: ABNORMAL MG/DL
HCT VFR BLD AUTO: 38.1 % (ref 36–46)
HGB BLD-MCNC: 12.3 G/DL (ref 12–16)
KETONES UR STRIP.AUTO-MCNC: NEGATIVE MG/DL
LEUKOCYTE ESTERASE UR QL STRIP.AUTO: NEGATIVE
MCH RBC QN AUTO: 29.4 PG (ref 26–34)
MCHC RBC AUTO-ENTMCNC: 32.3 G/DL (ref 32–36)
MCV RBC AUTO: 91 FL (ref 80–100)
MICROALBUMIN UR-MCNC: <7 MG/L
MICROALBUMIN/CREAT UR: NORMAL MG/G{CREAT}
MUCOUS THREADS #/AREA URNS AUTO: NORMAL /LPF
NITRITE UR QL STRIP.AUTO: NEGATIVE
NRBC BLD-RTO: 0 /100 WBCS (ref 0–0)
PH UR STRIP.AUTO: 5 [PH]
PHOSPHATE SERPL-MCNC: 4.2 MG/DL (ref 2.5–4.9)
PLATELET # BLD AUTO: 349 X10*3/UL (ref 150–450)
POTASSIUM SERPL-SCNC: 4.4 MMOL/L (ref 3.5–5.3)
PROT UR STRIP.AUTO-MCNC: NEGATIVE MG/DL
PROT UR-ACNC: 14 MG/DL (ref 5–24)
PROT/CREAT UR: 0.16 MG/MG CREAT (ref 0–0.17)
RBC # BLD AUTO: 4.19 X10*6/UL (ref 4–5.2)
RBC # UR STRIP.AUTO: ABNORMAL /UL
RBC #/AREA URNS AUTO: NORMAL /HPF
SODIUM SERPL-SCNC: 139 MMOL/L (ref 136–145)
SP GR UR STRIP.AUTO: 1.02
SQUAMOUS #/AREA URNS AUTO: NORMAL /HPF
UROBILINOGEN UR STRIP.AUTO-MCNC: NORMAL MG/DL
WBC # BLD AUTO: 10.6 X10*3/UL (ref 4.4–11.3)
WBC #/AREA URNS AUTO: NORMAL /HPF

## 2024-11-11 PROCEDURE — 85027 COMPLETE CBC AUTOMATED: CPT

## 2024-11-11 PROCEDURE — 84156 ASSAY OF PROTEIN URINE: CPT

## 2024-11-11 PROCEDURE — 82570 ASSAY OF URINE CREATININE: CPT

## 2024-11-11 PROCEDURE — 80069 RENAL FUNCTION PANEL: CPT

## 2024-11-11 PROCEDURE — 82043 UR ALBUMIN QUANTITATIVE: CPT

## 2024-11-11 PROCEDURE — 36415 COLL VENOUS BLD VENIPUNCTURE: CPT

## 2024-11-11 PROCEDURE — 81001 URINALYSIS AUTO W/SCOPE: CPT

## 2024-11-12 ENCOUNTER — APPOINTMENT (OUTPATIENT)
Dept: NEPHROLOGY | Facility: CLINIC | Age: 69
End: 2024-11-12
Payer: MEDICARE

## 2024-11-12 VITALS
WEIGHT: 172 LBS | DIASTOLIC BLOOD PRESSURE: 71 MMHG | SYSTOLIC BLOOD PRESSURE: 105 MMHG | TEMPERATURE: 98.6 F | HEIGHT: 61 IN | HEART RATE: 76 BPM | BODY MASS INDEX: 32.47 KG/M2

## 2024-11-12 DIAGNOSIS — N18.32 STAGE 3B CHRONIC KIDNEY DISEASE (MULTI): Primary | ICD-10-CM

## 2024-11-12 PROCEDURE — 3078F DIAST BP <80 MM HG: CPT | Performed by: INTERNAL MEDICINE

## 2024-11-12 PROCEDURE — 1159F MED LIST DOCD IN RCRD: CPT | Performed by: INTERNAL MEDICINE

## 2024-11-12 PROCEDURE — 3008F BODY MASS INDEX DOCD: CPT | Performed by: INTERNAL MEDICINE

## 2024-11-12 PROCEDURE — 1123F ACP DISCUSS/DSCN MKR DOCD: CPT | Performed by: INTERNAL MEDICINE

## 2024-11-12 PROCEDURE — 1160F RVW MEDS BY RX/DR IN RCRD: CPT | Performed by: INTERNAL MEDICINE

## 2024-11-12 PROCEDURE — 99213 OFFICE O/P EST LOW 20 MIN: CPT | Performed by: INTERNAL MEDICINE

## 2024-11-12 PROCEDURE — 3074F SYST BP LT 130 MM HG: CPT | Performed by: INTERNAL MEDICINE

## 2024-11-12 PROCEDURE — 1036F TOBACCO NON-USER: CPT | Performed by: INTERNAL MEDICINE

## 2024-11-12 NOTE — PROGRESS NOTES
Chief Complaint: Follow up CKD    No specific complaints  Not eating right  Denies nausea, vomiting, chest pain, dyspnea  No urinary symptoms  Home -130s at home    NAD  Sclera AI s inj  MMM, no sores  Deferred secondary to COVID  No edema  No tremor  No rash    CKD stage 3a-b  HTN well controlled here  Proteinuria none significant    Talked about diet  Discussed nutritionist, has seen in the past  No medication changes  Labs and follow up in 6 months

## 2024-11-18 ENCOUNTER — APPOINTMENT (OUTPATIENT)
Dept: PHARMACY | Facility: HOSPITAL | Age: 69
End: 2024-11-18
Payer: MEDICARE

## 2024-11-18 DIAGNOSIS — N18.31 STAGE 3A CHRONIC KIDNEY DISEASE (MULTI): Primary | ICD-10-CM

## 2024-11-18 NOTE — PROGRESS NOTES
Subjective   Patient ID: lEsy Molina is a 69 y.o. female who presents for CKD/sglt2    Referring Provider: Celia Angel    HPI  HPI: CKD stage 3b. last EGFR 36 , sCr 1.56, K 4.4  Tolerating Farxiga well no issues with dizziness/lightheadedness. No issues with painful/difficult urination.   HTN:  /71 at last ov    Medications  Lisinopril 20mg every day  Hydrochlorothiazide 25mg qd    glucose: controlled and monitored  A1C 6.0,  (11/11/24)  Medications    HLD:    On statin? Atorvastatin 20mg every day     Medications reviewed for appropriate dosing in setting of CKD  Recommended changes:  - no adjustments needed at this time      Objective     There were no vitals taken for this visit.     Labs  Lab Results   Component Value Date    BILITOT 0.4 11/21/2023    CALCIUM 10.0 11/11/2024    CO2 23 11/11/2024     11/11/2024    CREATININE 1.56 (H) 11/11/2024    GLUCOSE 130 (H) 11/11/2024    ALKPHOS 94 11/21/2023    K 4.4 11/11/2024    PROT 7.4 11/21/2023     11/11/2024    AST 11 11/21/2023    ALT 10 11/21/2023    BUN 31 (H) 11/11/2024    ANIONGAP 17 11/11/2024    PHOS 4.2 11/11/2024    ALBUMIN 4.3 11/11/2024    GFRF 46 (A) 07/19/2023     Lab Results   Component Value Date    TRIG 133 11/24/2023    CHOL 218 (H) 11/24/2023    LDLCALC 130 (H) 11/24/2023    HDL 61.9 11/24/2023     Lab Results   Component Value Date    HGBA1C 6.0 01/23/2019       Current Outpatient Medications on File Prior to Visit   Medication Sig Dispense Refill    atorvastatin (Lipitor) 20 mg tablet Take 1 tablet (20 mg) by mouth once daily. 30 tablet 5    buPROPion SR (Wellbutrin SR) 100 mg 12 hr tablet Take 1 tablet (100 mg) by mouth early in the morning.. Do not crush, chew, or split. 30 tablet 1    Farxiga 10 mg Take 1 tablet (10 mg) by mouth once daily. TAKE ONE TABLET BY MOUTH ONCE DAILY IN THE MORNING 90 tablet 3    hydroCHLOROthiazide (HYDRODiuril) 25 mg tablet Take 1 tablet (25 mg) by mouth once daily. 90 tablet 1     lamoTRIgine (LaMICtal) 150 mg tablet Take 2 tablets (300 mg) by mouth once daily. 180 tablet 0    lisinopril 20 mg tablet Take 1 tablet (20 mg) by mouth once daily. 90 tablet 1    pantoprazole (ProtoNix) 40 mg EC tablet Take 1 tablet (40 mg) by mouth once daily in the morning. Take before meals. 90 tablet 3    PARoxetine (Paxil) 30 mg tablet Take 0.5 tablets (15 mg) by mouth once daily. 45 tablet 1     No current facility-administered medications on file prior to visit.        Assessment/Plan   PATIENT EDUCATION/DISCUSSION:  - Counseled patient on MOA, expectations, side effects, duration of therapy, contraindications, administration, and monitoring parameters  - Answered all patient questions and concerns  - $47 farxiga   - Pt is not experiencing any CHRISTIE with Farxiga at this time.  - Follow up in 3 months  _______________________________________________________________________  PLAN  1. CONTINUE Farxiga 10mg qd  2. FU in 3 mo. 2/18/25 12:30    Leonela Mitchell  Pharmacy Student  Continue all meds under the continuation of care with the referring provider and clinical pharmacy team.

## 2024-12-09 ENCOUNTER — LAB (OUTPATIENT)
Dept: LAB | Facility: LAB | Age: 69
End: 2024-12-09
Payer: MEDICARE

## 2024-12-09 DIAGNOSIS — R73.09 ABNORMAL GLUCOSE: ICD-10-CM

## 2024-12-09 DIAGNOSIS — E55.9 VITAMIN D DEFICIENCY: ICD-10-CM

## 2024-12-09 DIAGNOSIS — R53.83 OTHER FATIGUE: ICD-10-CM

## 2024-12-09 DIAGNOSIS — E78.2 MIXED HYPERLIPIDEMIA: ICD-10-CM

## 2024-12-09 LAB
25(OH)D3 SERPL-MCNC: 39 NG/ML (ref 30–100)
ALBUMIN SERPL BCP-MCNC: 4.4 G/DL (ref 3.4–5)
ALP SERPL-CCNC: 80 U/L (ref 33–136)
ALT SERPL W P-5'-P-CCNC: 7 U/L (ref 7–45)
ANION GAP SERPL CALC-SCNC: 15 MMOL/L (ref 10–20)
AST SERPL W P-5'-P-CCNC: 10 U/L (ref 9–39)
BILIRUB SERPL-MCNC: 0.5 MG/DL (ref 0–1.2)
BUN SERPL-MCNC: 26 MG/DL (ref 6–23)
CALCIUM SERPL-MCNC: 10.4 MG/DL (ref 8.6–10.6)
CHLORIDE SERPL-SCNC: 104 MMOL/L (ref 98–107)
CHOLEST SERPL-MCNC: 223 MG/DL (ref 0–199)
CHOLESTEROL/HDL RATIO: 4
CO2 SERPL-SCNC: 26 MMOL/L (ref 21–32)
CREAT SERPL-MCNC: 1.42 MG/DL (ref 0.5–1.05)
EGFRCR SERPLBLD CKD-EPI 2021: 40 ML/MIN/1.73M*2
GLUCOSE SERPL-MCNC: 95 MG/DL (ref 74–99)
HDLC SERPL-MCNC: 56 MG/DL
LDLC SERPL CALC-MCNC: 139 MG/DL
NON HDL CHOLESTEROL: 167 MG/DL (ref 0–149)
POTASSIUM SERPL-SCNC: 5 MMOL/L (ref 3.5–5.3)
PROT SERPL-MCNC: 7.2 G/DL (ref 6.4–8.2)
SODIUM SERPL-SCNC: 140 MMOL/L (ref 136–145)
TRIGL SERPL-MCNC: 142 MG/DL (ref 0–149)
TSH SERPL-ACNC: 2.69 MIU/L (ref 0.44–3.98)
VLDL: 28 MG/DL (ref 0–40)

## 2024-12-09 PROCEDURE — 84443 ASSAY THYROID STIM HORMONE: CPT

## 2024-12-09 PROCEDURE — 36415 COLL VENOUS BLD VENIPUNCTURE: CPT

## 2024-12-09 PROCEDURE — 82306 VITAMIN D 25 HYDROXY: CPT

## 2024-12-09 PROCEDURE — 80061 LIPID PANEL: CPT

## 2024-12-09 PROCEDURE — 80053 COMPREHEN METABOLIC PANEL: CPT

## 2024-12-10 ENCOUNTER — APPOINTMENT (OUTPATIENT)
Dept: PRIMARY CARE | Facility: CLINIC | Age: 69
End: 2024-12-10
Payer: MEDICARE

## 2024-12-10 VITALS
SYSTOLIC BLOOD PRESSURE: 124 MMHG | DIASTOLIC BLOOD PRESSURE: 80 MMHG | HEART RATE: 78 BPM | HEIGHT: 61 IN | OXYGEN SATURATION: 97 % | BODY MASS INDEX: 32.28 KG/M2 | WEIGHT: 171 LBS

## 2024-12-10 DIAGNOSIS — K21.9 GASTROESOPHAGEAL REFLUX DISEASE WITHOUT ESOPHAGITIS: ICD-10-CM

## 2024-12-10 DIAGNOSIS — Z78.0 MENOPAUSE: ICD-10-CM

## 2024-12-10 DIAGNOSIS — H81.13 BENIGN PAROXYSMAL POSITIONAL VERTIGO DUE TO BILATERAL VESTIBULAR DISORDER: ICD-10-CM

## 2024-12-10 DIAGNOSIS — E78.2 MIXED HYPERLIPIDEMIA: ICD-10-CM

## 2024-12-10 DIAGNOSIS — Z00.00 MEDICARE ANNUAL WELLNESS VISIT, SUBSEQUENT: Primary | ICD-10-CM

## 2024-12-10 DIAGNOSIS — I10 BENIGN ESSENTIAL HYPERTENSION: ICD-10-CM

## 2024-12-10 PROCEDURE — G0439 PPPS, SUBSEQ VISIT: HCPCS | Performed by: FAMILY MEDICINE

## 2024-12-10 PROCEDURE — 3079F DIAST BP 80-89 MM HG: CPT | Performed by: FAMILY MEDICINE

## 2024-12-10 PROCEDURE — 3008F BODY MASS INDEX DOCD: CPT | Performed by: FAMILY MEDICINE

## 2024-12-10 PROCEDURE — 1123F ACP DISCUSS/DSCN MKR DOCD: CPT | Performed by: FAMILY MEDICINE

## 2024-12-10 PROCEDURE — 1036F TOBACCO NON-USER: CPT | Performed by: FAMILY MEDICINE

## 2024-12-10 PROCEDURE — 99214 OFFICE O/P EST MOD 30 MIN: CPT | Performed by: FAMILY MEDICINE

## 2024-12-10 PROCEDURE — 3074F SYST BP LT 130 MM HG: CPT | Performed by: FAMILY MEDICINE

## 2024-12-10 PROCEDURE — 1170F FXNL STATUS ASSESSED: CPT | Performed by: FAMILY MEDICINE

## 2024-12-10 PROCEDURE — 1159F MED LIST DOCD IN RCRD: CPT | Performed by: FAMILY MEDICINE

## 2024-12-10 PROCEDURE — 1160F RVW MEDS BY RX/DR IN RCRD: CPT | Performed by: FAMILY MEDICINE

## 2024-12-10 RX ORDER — HYDROCHLOROTHIAZIDE 25 MG/1
25 TABLET ORAL DAILY
Qty: 90 TABLET | Refills: 1 | Status: SHIPPED | OUTPATIENT
Start: 2024-12-10

## 2024-12-10 RX ORDER — ATORVASTATIN CALCIUM 20 MG/1
20 TABLET, FILM COATED ORAL DAILY
Qty: 90 TABLET | Refills: 3 | Status: SHIPPED | OUTPATIENT
Start: 2024-12-10

## 2024-12-10 RX ORDER — PANTOPRAZOLE SODIUM 40 MG/1
40 TABLET, DELAYED RELEASE ORAL
Qty: 90 TABLET | Refills: 3 | Status: SHIPPED | OUTPATIENT
Start: 2024-12-10

## 2024-12-10 RX ORDER — LISINOPRIL 20 MG/1
20 TABLET ORAL DAILY
Qty: 90 TABLET | Refills: 1 | Status: SHIPPED | OUTPATIENT
Start: 2024-12-10

## 2024-12-10 RX ORDER — FAMOTIDINE 20 MG/1
20 TABLET, FILM COATED ORAL 2 TIMES DAILY PRN
Qty: 60 TABLET | Refills: 3 | Status: SHIPPED | OUTPATIENT
Start: 2024-12-10

## 2024-12-10 ASSESSMENT — ACTIVITIES OF DAILY LIVING (ADL)
DRESSING: INDEPENDENT
BATHING: INDEPENDENT
MANAGING_FINANCES: INDEPENDENT
DOING_HOUSEWORK: INDEPENDENT
TAKING_MEDICATION: INDEPENDENT
GROCERY_SHOPPING: INDEPENDENT

## 2024-12-10 ASSESSMENT — PATIENT HEALTH QUESTIONNAIRE - PHQ9
4. FEELING TIRED OR HAVING LITTLE ENERGY: NOT AT ALL
10. IF YOU CHECKED OFF ANY PROBLEMS, HOW DIFFICULT HAVE THESE PROBLEMS MADE IT FOR YOU TO DO YOUR WORK, TAKE CARE OF THINGS AT HOME, OR GET ALONG WITH OTHER PEOPLE: NOT DIFFICULT AT ALL
SUM OF ALL RESPONSES TO PHQ QUESTIONS 1-9: 0
SUM OF ALL RESPONSES TO PHQ9 QUESTIONS 1 AND 2: 0
7. TROUBLE CONCENTRATING ON THINGS, SUCH AS READING THE NEWSPAPER OR WATCHING TELEVISION: NOT AT ALL
9. THOUGHTS THAT YOU WOULD BE BETTER OFF DEAD, OR OF HURTING YOURSELF: NOT AT ALL
8. MOVING OR SPEAKING SO SLOWLY THAT OTHER PEOPLE COULD HAVE NOTICED. OR THE OPPOSITE, BEING SO FIGETY OR RESTLESS THAT YOU HAVE BEEN MOVING AROUND A LOT MORE THAN USUAL: NOT AT ALL
5. POOR APPETITE OR OVEREATING: NOT AT ALL
6. FEELING BAD ABOUT YOURSELF - OR THAT YOU ARE A FAILURE OR HAVE LET YOURSELF OR YOUR FAMILY DOWN: NOT AT ALL
3. TROUBLE FALLING OR STAYING ASLEEP OR SLEEPING TOO MUCH: NOT AT ALL
2. FEELING DOWN, DEPRESSED OR HOPELESS: NOT AT ALL
1. LITTLE INTEREST OR PLEASURE IN DOING THINGS: NOT AT ALL

## 2024-12-10 ASSESSMENT — ENCOUNTER SYMPTOMS
OCCASIONAL FEELINGS OF UNSTEADINESS: 0
LOSS OF SENSATION IN FEET: 0
DEPRESSION: 0

## 2024-12-10 ASSESSMENT — LIFESTYLE VARIABLES
HOW OFTEN DO YOU HAVE SIX OR MORE DRINKS ON ONE OCCASION: NEVER
SKIP TO QUESTIONS 9-10: 1
HOW OFTEN DO YOU HAVE A DRINK CONTAINING ALCOHOL: 2-4 TIMES A MONTH
HOW MANY STANDARD DRINKS CONTAINING ALCOHOL DO YOU HAVE ON A TYPICAL DAY: 1 OR 2
AUDIT-C TOTAL SCORE: 2

## 2024-12-10 NOTE — ASSESSMENT & PLAN NOTE
Controlled on current medications.  Refilling at current dosage.  Follow-up  6 months.  Orders:    hydroCHLOROthiazide (HYDRODiuril) 25 mg tablet; Take 1 tablet (25 mg) by mouth once daily.    lisinopril 20 mg tablet; Take 1 tablet (20 mg) by mouth once daily.

## 2024-12-10 NOTE — PATIENT INSTRUCTIONS
Restart Atorvastatin once daily    Continue Pantoprazole 40 mg once daily - adding Pepcid (Famotidine) 20-40 mg twice daily as needed for any breakthrough symptoms  Ordered EGD with Dr. Klein - her office will call you to set this up

## 2024-12-10 NOTE — PROGRESS NOTES
"Subjective   Reason for Visit: Elsy Molina is an 69 y.o. female here for a Medicare Wellness visit.               HPI    FALL:  was pulling out roots in her yard 3 weeks ago  and hit her head on a step  Now with spinning sensation and visual changes just about daily  Lasts a minute or 2   No headache, no tinnitus    SOC Hx:   Tobacco Use: Low Risk  (12/10/2024)    Patient History     Smoking Tobacco Use: Never     Smokeless Tobacco Use: Never     Passive Exposure: Not on file     Alcohol Use: Not At Risk (12/10/2024)    AUDIT-C     Frequency of Alcohol Consumption: 2-4 times a month     Average Number of Drinks: 1 or 2     Frequency of Binge Drinking: Never     DIET:  regular - admittedly not as healthy as in the past    EXERCISE:  irregularly    ELIMINATION: no constipation or diarrhea  GERD uncontrolled on Pantoprazole - every few weeks will have breakthrough with burning sensation her esophagus  COLON CA SCREENING: COLONOSCOPY 9/17/2021 REPEAT DUE 5 years    URINARY SYSTEM:  no symptoms reported    SLEEP:   sleeping more during the day  Using her CPAP regularly at night but still falling asleep when she sits down will fall asleep    MOODS:  decreased motivation - followed by Dr. Simpson, psychiatry  Patient Health Questionnaire-9 Score: 0           OB/GYN: LMP: No LMP recorded. Patient is postmenopausal.  Menstrual cycles - menopausal  MAMMO:  6/18/2024  DEXA: 6/6/2021     Patient Care Team:  Jennifer Gama DO as PCP - General  Kelly Sanchez Pla, DO as PCP - [employer] PCP     Review of Systems    Objective   Vitals:  /80 (BP Location: Left arm, Patient Position: Sitting, BP Cuff Size: Adult)   Pulse 78   Ht 1.549 m (5' 1\")   Wt 77.6 kg (171 lb)   SpO2 97%   BMI 32.31 kg/m²       Physical Exam    Assessment & Plan  Medicare annual wellness visit, subsequent    Orders:    1 Year Follow Up In [facility] - PCP - Wellness Exam; Future    Gastroesophageal reflux disease " without esophagitis  Not ideally controlled - referring for EGD and refilling medications as below.  Orders:    famotidine (Pepcid) 20 mg tablet; Take 1 tablet (20 mg) by mouth 2 times a day as needed for heartburn.    Esophagogastroduodenoscopy (EGD); Future    pantoprazole (ProtoNix) 40 mg EC tablet; Take 1 tablet (40 mg) by mouth once daily in the morning. Take before meals.    Benign essential hypertension  Controlled on current medications.  Refilling at current dosage.  Follow-up  6 months.  Orders:    hydroCHLOROthiazide (HYDRODiuril) 25 mg tablet; Take 1 tablet (25 mg) by mouth once daily.    lisinopril 20 mg tablet; Take 1 tablet (20 mg) by mouth once daily.    Mixed hyperlipidemia  Stable.  Refilling at current dosage.  Follow up 6 months.  Orders:    atorvastatin (Lipitor) 20 mg tablet; Take 1 tablet (20 mg) by mouth once daily.    Comprehensive metabolic panel; Future    Lipid Panel; Future    Menopause  Calcium supplementation doses discussed.  Orders:    XR DEXA bone density; Future    Benign paroxysmal positional vertigo due to bilateral vestibular disorder  Continue prn Meclizine and referring to PT for vestibular treatments.  Orders:    Referral to Physical Therapy; Future       Depression Screening  5 - 10 minutes were spent screening for depression.    Follow-up  6 months.

## 2024-12-10 NOTE — ASSESSMENT & PLAN NOTE
Stable.  Refilling at current dosage.  Follow up 6 months.  Orders:    atorvastatin (Lipitor) 20 mg tablet; Take 1 tablet (20 mg) by mouth once daily.    Comprehensive metabolic panel; Future    Lipid Panel; Future

## 2024-12-10 NOTE — ASSESSMENT & PLAN NOTE
Not ideally controlled - referring for EGD and refilling medications as below.  Orders:    famotidine (Pepcid) 20 mg tablet; Take 1 tablet (20 mg) by mouth 2 times a day as needed for heartburn.    Esophagogastroduodenoscopy (EGD); Future    pantoprazole (ProtoNix) 40 mg EC tablet; Take 1 tablet (40 mg) by mouth once daily in the morning. Take before meals.

## 2024-12-28 ASSESSMENT — ACTIVITIES OF DAILY LIVING (ADL)
GROCERY_SHOPPING: INDEPENDENT
MANAGING_FINANCES: INDEPENDENT
BATHING: INDEPENDENT
TAKING_MEDICATION: INDEPENDENT
DRESSING: INDEPENDENT
DOING_HOUSEWORK: INDEPENDENT

## 2025-01-14 ENCOUNTER — CLINICAL SUPPORT (OUTPATIENT)
Dept: PHYSICAL THERAPY | Facility: CLINIC | Age: 70
End: 2025-01-14
Payer: MEDICARE

## 2025-01-14 ENCOUNTER — HOSPITAL ENCOUNTER (OUTPATIENT)
Dept: RADIOLOGY | Facility: CLINIC | Age: 70
Discharge: HOME | End: 2025-01-14
Payer: MEDICARE

## 2025-01-14 DIAGNOSIS — Z78.0 MENOPAUSE: ICD-10-CM

## 2025-01-14 DIAGNOSIS — H81.13 BENIGN PAROXYSMAL POSITIONAL VERTIGO DUE TO BILATERAL VESTIBULAR DISORDER: ICD-10-CM

## 2025-01-14 PROCEDURE — 77080 DXA BONE DENSITY AXIAL: CPT

## 2025-01-14 PROCEDURE — 97112 NEUROMUSCULAR REEDUCATION: CPT | Mod: GP

## 2025-01-14 PROCEDURE — 97161 PT EVAL LOW COMPLEX 20 MIN: CPT | Mod: GP

## 2025-01-14 PROCEDURE — 77080 DXA BONE DENSITY AXIAL: CPT | Performed by: RADIOLOGY

## 2025-01-14 ASSESSMENT — ENCOUNTER SYMPTOMS
LOSS OF SENSATION IN FEET: 0
OCCASIONAL FEELINGS OF UNSTEADINESS: 0
DEPRESSION: 0

## 2025-01-14 NOTE — PROGRESS NOTES
Physical Therapy Evaluation    Subjective:  Patient Name: Elsy Molina  MRN: [id number]  Today's Date: 1/14/2025  Visit: 1/MN  Referred by:  Jennifer Gama  Time Calculation  Start Time: 1500  Stop Time: 1545  Time Calculation (min): 45 min  Diagnosis: dizziness  Onset: 10/1/24  HPI:  Patient was pulling out roots in her yard and hit her head on a step.    Was given exercises by PCP which seemed to help somewhat initially.  Sx have returned in the week.  Sx present when getting up from bed or scooting in bed, head movements, looking down  Describes as spinning sensation, lasting 30 sec  Denies HA, tinnitus, difficulty focusing, neck pain    Current Medical Management: given HEP by PCP  Precautions: none  Functional Limitations: Avoids ladders  Prior Level of Function: indep with IADLs  Work Status: RN in surgery  Living Environment: 2 story home  Social Support: lives alone  Personal Factors that may impact care: none    Objective:    Gait: unremarkable aside from fwd head posture    Positional Testing:  Chandler Hallpke:  R= + brief up beating R torsional nystagmus  L= negative  Roll Test:  R= negative  L= negative    Treatment Performed Today:  Performed Epley for possible R posterior canal BPPV.  Retested with Chandler Hallpike with lessened sx.  Repeated R Epley x 2 reps with less sx upon each retesting.  Issued handout for self epley maneuver as well as daniela bryant is sx persist    Assessment:  70 yo F with mild sx consistent with R posterior canal BPPV.  Appeared to tolerate canalith repositioning well.  Will schedule follow up visit in case sx persist at which time would benefit from continued vestibular PT.       . Low complexity due to patient's clinical presentation being stable and uncomplicated by any significant comorbidities that may affect rehab tolerance and progression.    Clinical presentation:  Stable and/or uncomplicated characteristics  Problem List:  Dizziness with bed mobility and head movements  Level of  Complexity:  low  Plan:  -Goals:  Active       PT Problem       Patient without dizziness with bed mobility.       Start:  01/14/25    Expected End:  04/14/25            Indep management of sx       Start:  01/14/25    Expected End:  04/14/25            Patient to report no dizziness with head movements.       Start:  01/14/25    Expected End:  04/14/25            Negative Benedict Hallpike       Start:  01/14/25    Expected End:  04/14/25              -Frequency & Duration:  Will schedule follow for 1 week.  Further determine POC at that time  -Rehab Potential:  good  Plan of care was developed with input and agreement of the patient.    Billing:  PT Evaluation Time Entry  PT Evaluation (Low) Time Entry: 30  PT Therapeutic Procedures Time Entry  Neuromuscular Re-Education Time Entry: 15

## 2025-01-17 NOTE — RESULT ENCOUNTER NOTE
Your Bone Density is normal.  Recommendations:  1 - Maintain adequate Calcium + Vitamin D intake.  Normally we recommend 500 - 600 mg of Calcium + Vitamin D twice daily.  2 - Continue or start regular weight bearing type activities.  3 - Avoid tobacco and alcohol use.  4 - Maintain healthy lifestyle to include well rounded diet.  5 - Repeat Bone Density in 2 years.

## 2025-02-04 ENCOUNTER — APPOINTMENT (OUTPATIENT)
Dept: PHYSICAL THERAPY | Facility: CLINIC | Age: 70
End: 2025-02-04
Payer: MEDICARE

## 2025-02-18 ENCOUNTER — APPOINTMENT (OUTPATIENT)
Dept: PHARMACY | Facility: HOSPITAL | Age: 70
End: 2025-02-18
Payer: MEDICARE

## 2025-02-24 ENCOUNTER — TELEPHONE (OUTPATIENT)
Dept: BEHAVIORAL HEALTH | Facility: CLINIC | Age: 70
End: 2025-02-24
Payer: MEDICARE

## 2025-02-24 DIAGNOSIS — F31.70 BIPOLAR AFFECTIVE DISORDER IN REMISSION (MULTI): ICD-10-CM

## 2025-02-24 RX ORDER — LAMOTRIGINE 100 MG/1
TABLET ORAL
Qty: 42 TABLET | Refills: 0 | Status: SHIPPED | OUTPATIENT
Start: 2025-02-24 | End: 2025-03-24

## 2025-02-24 RX ORDER — LAMOTRIGINE 25 MG/1
TABLET ORAL
Qty: 42 TABLET | Refills: 0 | Status: SHIPPED | OUTPATIENT
Start: 2025-02-24 | End: 2025-03-24

## 2025-02-24 NOTE — PROGRESS NOTES
Patient has been off of lamictal for 10 days, she will need to restart at 25 mg daily and taper up from there as indicated on her prescriptions.   I spoke with her on the phone (2/24/2025, 4:14 pm) and reviewed need to monitor for a rash and to report to the ER if serious rash or blistering occurs. If rash is mild she can stop lamictal and contact myself or pcp.

## 2025-02-24 NOTE — TELEPHONE ENCOUNTER
Called patient regarding request for lamictal refill. It appears she may have run out of the medication which would require that the medication be re-titrated. Left a message for patient requesting this information.

## 2025-03-25 ENCOUNTER — APPOINTMENT (OUTPATIENT)
Dept: PHARMACY | Facility: HOSPITAL | Age: 70
End: 2025-03-25
Payer: MEDICARE

## 2025-03-26 ENCOUNTER — TELEPHONE (OUTPATIENT)
Dept: BEHAVIORAL HEALTH | Facility: CLINIC | Age: 70
End: 2025-03-26
Payer: MEDICARE

## 2025-04-14 ENCOUNTER — APPOINTMENT (OUTPATIENT)
Dept: BEHAVIORAL HEALTH | Facility: CLINIC | Age: 70
End: 2025-04-14
Payer: MEDICARE

## 2025-04-14 ENCOUNTER — TELEPHONE (OUTPATIENT)
Dept: BEHAVIORAL HEALTH | Facility: CLINIC | Age: 70
End: 2025-04-14

## 2025-04-14 DIAGNOSIS — F33.41 RECURRENT MAJOR DEPRESSIVE DISORDER, IN PARTIAL REMISSION (CMS-HCC): ICD-10-CM

## 2025-04-14 DIAGNOSIS — F90.0 ATTENTION DEFICIT HYPERACTIVITY DISORDER (ADHD), PREDOMINANTLY INATTENTIVE TYPE: ICD-10-CM

## 2025-04-14 DIAGNOSIS — F31.70 BIPOLAR AFFECTIVE DISORDER IN REMISSION (MULTI): ICD-10-CM

## 2025-04-14 PROCEDURE — 1036F TOBACCO NON-USER: CPT | Performed by: PSYCHIATRY & NEUROLOGY

## 2025-04-14 PROCEDURE — 1159F MED LIST DOCD IN RCRD: CPT | Performed by: PSYCHIATRY & NEUROLOGY

## 2025-04-14 PROCEDURE — 1123F ACP DISCUSS/DSCN MKR DOCD: CPT | Performed by: PSYCHIATRY & NEUROLOGY

## 2025-04-14 PROCEDURE — 1160F RVW MEDS BY RX/DR IN RCRD: CPT | Performed by: PSYCHIATRY & NEUROLOGY

## 2025-04-14 PROCEDURE — 99214 OFFICE O/P EST MOD 30 MIN: CPT | Performed by: PSYCHIATRY & NEUROLOGY

## 2025-04-14 RX ORDER — BUPROPION HYDROCHLORIDE 150 MG/1
150 TABLET, EXTENDED RELEASE ORAL
Qty: 30 TABLET | Refills: 1 | Status: SHIPPED | OUTPATIENT
Start: 2025-04-14 | End: 2025-06-13

## 2025-04-14 RX ORDER — LAMOTRIGINE 150 MG/1
300 TABLET ORAL DAILY
Qty: 180 TABLET | Refills: 0 | Status: SHIPPED | OUTPATIENT
Start: 2025-04-14 | End: 2025-07-13

## 2025-04-14 NOTE — PROGRESS NOTES
Kallie the pharmacist from [facility] calling in regarding medication below. She needs clarity on the frequency. She states the medication is usually taken every 12 hrs. Please advise [phone]      buPROPion SR (Wellbutrin SR) 150 mg 12 hr tablet

## 2025-04-14 NOTE — PATIENT INSTRUCTIONS
-continue to increase lamictal to 300 mg daily as instructed  and paxil 15 mg daily   -start wellbutrin SR 1050 mg in the am,  monitor for any increase in blood pressure when taking wellbutrin  -I will see you in about 4 weeks  -call [phone] with questions or concerns   -call nephrology to be placed on the waiting list for cancellations for any available nephrologist  -call your primary care physician to discuss hydrochlorothiazide use in the setting of recent decline in renal function     -If there are any thoughts of harming your self, harming others, concerning worsening of your mental health symptoms or concerning medication side effects, please call [phone] or reports to the nearest emergency room.

## 2025-04-14 NOTE — PROGRESS NOTES
"Outpatient Psychiatry          Reason for Visit: routine follow up for Bipolar II versus I; ADHD; anxiety unspecified.   Reports she is \"good\". \"Working and trying to keep finances together\".   Addition of wellbutrin was not helpful with motivation thus she stopped taking.     HPI:70 y/o female who presented 9/20/2021 for management of ADHD and bipolar II. She reports that is mood is stable, she is anxious related to political environment and impact on her finances. Thankful she still has a job.    She still lacks motivation, house is not neat since stopping ADHD medication.    Denies difficulty with social anxiety.   Notes there is chronic level of low motivation, feels chronically less happy than others, not very excited about socializing. She does see family but often does not feel like going out (she has always been this was and never fostered many new friendships). lazy.   Not sad, no asia   No death wish or SI.      Told she has decline in renal function - has nephrologist recommended stopping stimulants due to renal impact.    She recalls depression beginning by her teen years. Depression is chronic and never seems to fully resolve. Depressive exacerbations consist of tearfulness, lacks motivation/no energy, isolates, cancels appointments, isolates, irritable, increased sleep, lacks self confidence . There has been no death wish or SI for years. No hallucinations, no paranoia. Elevated moods have consisted of impulsive behavior (resulted in divorce, met with men she knew nothing about), spends a great deal of money, excited, goes off on tangents when speaking, starting many projects, hyper, - she recalls three episodes of highly impulsive behavior/out of character that lasted for a few months.   Denies excessive chronic daily worry but can be ruminative, no panic attack, no compulsions.   She has been dx with ADHD by some sort of testing. She recalls being easily frustrated during school, but does not " recall having specific difficulties paying attention during school. She thinks Adderall helps her focus , stay on task. . The last adderall rx was 2/2021. historically is has been hard to concentrate at work as she thinks of other things prior to finishing what she is doing.       PAST PSYCH H/O: bipolar II dx 4 years ago, dx ADHD dx around 2014. First mental health treatment by pcp with anti depressants around her mid 30's and she has remained on meds since that time. She has been in counseling in the past.   PAST PSYCH HOSP: denied  SELF HARM: none , but had SI in her 30's and 40's  PAST PSYCH MEDS: adderall, many antidepressants- cymbalta, prozac, paxil, effexor, wellbutrin, lexapro, zoloft. lamcital,     SUBSTANCE USE H/O: no h/o tobacco/nicotine, drinks wine 1-2 times per week - about 1 glass, denies drug use    FAMILY PSYCH H/O: sisters- depression, mom - depression, niece- suicided 1 year ago (24 y/o)     PAST MED H/O: OA, GERD   ALL: NKDA       SOCIAL H/O: lives alone,  X 1, 3 grown children who she is. close with , close with her 2 sisters. 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 denies victimization h/o        Current Medications:    Current Outpatient Medications:     atorvastatin (Lipitor) 20 mg tablet, Take 1 tablet (20 mg) by mouth once daily., Disp: 90 tablet, Rfl: 3    buPROPion SR (Wellbutrin SR) 100 mg 12 hr tablet, Take 1 tablet (100 mg) by mouth early in the morning.. Do not crush, chew, or split., Disp: 30 tablet, Rfl: 1    famotidine (Pepcid) 20 mg tablet, Take 1 tablet (20 mg) by mouth 2 times a day as needed for heartburn., Disp: 60 tablet, Rfl: 3    Farxiga 10 mg, Take 1 tablet (10 mg) by mouth once daily. TAKE ONE TABLET BY MOUTH ONCE DAILY IN THE MORNING, Disp: 90 tablet, Rfl: 3    hydroCHLOROthiazide (HYDRODiuril) 25 mg tablet, Take 1 tablet (25 mg) by mouth once daily., Disp: 90 tablet,  Rfl: 1    lamoTRIgine (LaMICtal) 100 mg tablet, Take 1 tablet (100 mg) by mouth once daily for 14 days, THEN 2 tablets (200 mg) once daily for 14 days., Disp: 42 tablet, Rfl: 0    lamoTRIgine (LaMICtal) 150 mg tablet, Take 2 tablets (300 mg) by mouth once daily., Disp: 180 tablet, Rfl: 0    lamoTRIgine (LaMICtal) 25 mg tablet, Take 1 tablet (25 mg) by mouth once daily for 14 days, THEN 2 tablets (50 mg) once daily for 14 days., Disp: 42 tablet, Rfl: 0    lisinopril 20 mg tablet, Take 1 tablet (20 mg) by mouth once daily., Disp: 90 tablet, Rfl: 1    pantoprazole (ProtoNix) 40 mg EC tablet, Take 1 tablet (40 mg) by mouth once daily in the morning. Take before meals., Disp: 90 tablet, Rfl: 3    PARoxetine (Paxil) 30 mg tablet, Take 0.5 tablets (15 mg) by mouth once daily., Disp: 45 tablet, Rfl: 1  Medical History:  Past Medical History:   Diagnosis Date    Chronic rhinitis 2014    Chronic rhinitis    Encounter for screening for malignant neoplasm of colon 2019    Colon cancer screening    Fever, unspecified 2015    Fever of unknown origin    Personal history of other diseases of the digestive system     History of esophageal reflux    Spinal stenosis, site unspecified     Spinal stenosis     Surgical History:  Past Surgical History:   Procedure Laterality Date    BELT ABDOMINOPLASTY  2014    Abdominoplasty     SECTION, CLASSIC  2013     Section    KNEE ARTHROPLASTY  2013    Knee Arthroplasty    LUMBAR FUSION  2013    Lumbar Vertebral Fusion    OTHER SURGICAL HISTORY  2015    Breast Surgery Enlargement Procedure    OTHER SURGICAL HISTORY  2014    Breast Surgery Mastopexy    OTHER SURGICAL HISTORY  2014    Stapedectomy - Left Ear    SINUS SURGERY  2013    Sinus Surgery     Family History:  Family History   Problem Relation Name Age of Onset    Heart failure Mother      Osteoarthritis Mother      Coronary artery disease Mother       Hypertension Mother      Hypertension Father      Hypertension Sister      Migraines Sister       Social History:  Social History     Socioeconomic History    Marital status:      Spouse name: Not on file    Number of children: Not on file    Years of education: Not on file    Highest education level: Not on file   Occupational History    Not on file   Tobacco Use    Smoking status: Never    Smokeless tobacco: Never   Substance and Sexual Activity    Alcohol use: Yes     Comment: < 2 per week    Drug use: Never    Sexual activity: Not on file   Other Topics Concern    Not on file   Social History Narrative    Not on file     Social Drivers of Health     Financial Resource Strain: Not on file   Food Insecurity: Not on file   Transportation Needs: Not on file   Physical Activity: Not on file   Stress: Not on file   Social Connections: Not on file   Intimate Partner Violence: Not on file   Housing Stability: Not on file       Medical Review Of Systems:  Denies SOB, chest pain , n/v/d, no falls,     Psychiatric Review Of Systems:  Psychiatric ROS - Adult  Anxiety: General Anxiety Disorder (BONNY)BONNY Behaviors: difficult to control worry, excessive anxiety/worry, and easily fatigued  Depression: energy and interest  Delirium: negative  Psychosis: negative  Christianne: negative  Safety Issues: none    Disordered Eating Symptoms:denies   Inattentive Symptoms: improved attention with adderall    Hyperactive/Impulsive Symptoms:n/a     Trauma Symptoms:n/a        There were no vitals taken for this visit.     LABS  Lab Results   Component Value Date    TSH 2.69 12/09/2024    HDL 56.0 12/09/2024      Lab Results   Component Value Date    CHOL 223 (H) 12/09/2024    CHOL 218 (H) 11/24/2023    CHOL 195 06/03/2023     Lab Results   Component Value Date    HDL 56.0 12/09/2024    HDL 61.9 11/24/2023    HDL 56.2 06/03/2023     Lab Results   Component Value Date    LDLCALC 139 (H) 12/09/2024    LDLCALC 130 (H) 11/24/2023     Lab  "Results   Component Value Date    TRIG 142 12/09/2024    TRIG 133 11/24/2023    TRIG 132 06/03/2023     No components found for: \"CHOLHDL\"     Objective   Mental Status Exam:  Mental Status Examination  General Appearance: Well groomed, appropriate eye contact.  Gait/Station: not examined   Speech: Normal rate, volume, prosody  Mood: fair, lacks interest/motivation chronically   Affect: Euthymic, full-range  Thought Process: Linear, goal directed  Thought Associations: No loosening of associations  Thought Content: normal  Perception: No perceptual abnormalities noted  Level of Consciousness: Alert  Orientation: Alert and oriented to person, place, time and situation  Attention and Concentration: Intact  Recent Memory: Intact as evidenced by ability to recall details from the past 24 hours   Remote Memory: Intact as evidenced by ability to recall previous medical issues   Executive function: Intact  Language: no deficits noted   Fund of knowledge: Good  Insight: intact   Judgment: intact         Assessment/Plan   70 y/o  female who presented 9/20/2021 as a transfer of care regarding management of bipolar disorder (I versus II) and dx of ADHD. She is doing well with a combination of lamictal, and paxil regarding mood stability and anxiety.  She has stopped  Adderall  related to renal issues and has noted decrease productivity, not caring for house as she did. She did not find great benefit in motivation with low dose wellbutrin, we have agreed to increase the dose.  There has been no death wish or SI for years and no h/o psychosis. There is no h/o self harm. .      dx: Bipolar II versus I; ADHD; anxiety unspecified      1) continue to work lamictal back up to 300 mg daily as instructed ( she ran out and had to restart taper) ; continue  paxil  15 mg daily   2) trial wellbutrin  mg in the am - she will check blood pressure to be sure that this does not result in elevated blood pressure   3) reviewed " available labs,  4) f/u 4 weeks     Reviewed r/b/a of medications, side effects reviewed, patient gives informed consent for each medication. Patient understands to report to the er/urgent care if there are concerning medication side effects.          Patient Active Problem List   Diagnosis    Constipation    Abnormal glucose    Abnormal mammogram of left breast    Anemia    Anxiety    ADHD (attention deficit hyperactivity disorder)    Benign essential hypertension    Bipolar mood disorder (Multi)    Depression    Daytime sleepiness    Dysphagia    GERD (gastroesophageal reflux disease)    Eyelash scantiness    Pyelonephritis    Somatic dysfunction of lower extremities    Somatic dysfunction of lumbar region    Somatic dysfunction of pelvic region    Somatic dysfunction of sacral region    Arthritis    Asymmetric SNHL (sensorineural hearing loss)    Chronic rhinitis    Snoring    Elevated fasting lipid profile    Hyperlipemia    Hematuria    Hip pain    Lichen sclerosus    Obstructive sleep apnea    Osteoarthritis of knee    Trigger finger    Medicare annual wellness visit, subsequent    High risk medication use    Vitamin D insufficiency    Hx of primary hypertension    Sacroiliitis, not elsewhere classified           Review with patient: Treatment plan reviewed with the patient.  Medication risks/benefit reviewed with the patient  Psychiatric Risk Assessment  Violence Risk Assessment: none  Acute Risk of Harm to Others is Considered: low   Suicide Risk Assessment: age > 65 yrs old and current psychiatric illness  Protective Factors against Suicide: adherence to  treatment, employment, fear of social disapproval, fear of suicide, sense of responsibility toward family, social support/connectedness, and strong coping skills  Acute Risk of Harm to Self is Considered: low        Luz Marina Simpson, DO

## 2025-04-22 ENCOUNTER — APPOINTMENT (OUTPATIENT)
Dept: PHARMACY | Facility: HOSPITAL | Age: 70
End: 2025-04-22
Payer: MEDICARE

## 2025-06-10 ENCOUNTER — APPOINTMENT (OUTPATIENT)
Dept: NEPHROLOGY | Facility: CLINIC | Age: 70
End: 2025-06-10
Payer: MEDICARE

## 2025-06-10 LAB
ALBUMIN SERPL-MCNC: 4.7 G/DL (ref 3.6–5.1)
ALBUMIN/CREAT UR: 115 MG/G CREAT
APPEARANCE UR: CLEAR
BACTERIA #/AREA URNS HPF: ABNORMAL /HPF
BILIRUB UR QL STRIP: NEGATIVE
BUN SERPL-MCNC: 33 MG/DL (ref 7–25)
BUN/CREAT SERPL: 23 (CALC) (ref 6–22)
CALCIUM SERPL-MCNC: 10.1 MG/DL (ref 8.6–10.4)
CHLORIDE SERPL-SCNC: 103 MMOL/L (ref 98–110)
CO2 SERPL-SCNC: 23 MMOL/L (ref 20–32)
COLOR UR: YELLOW
CREAT SERPL-MCNC: 1.41 MG/DL (ref 0.6–1)
CREAT UR-MCNC: 26 MG/DL (ref 20–275)
CREAT UR-MCNC: 26 MG/DL (ref 20–275)
EGFRCR SERPLBLD CKD-EPI 2021: 40 ML/MIN/1.73M2
ERYTHROCYTE [DISTWIDTH] IN BLOOD BY AUTOMATED COUNT: 13.9 % (ref 11–15)
GLUCOSE SERPL-MCNC: 109 MG/DL (ref 65–99)
GLUCOSE UR QL STRIP: ABNORMAL
HCT VFR BLD AUTO: 38.5 % (ref 35–45)
HGB BLD-MCNC: 12.5 G/DL (ref 11.7–15.5)
HGB UR QL STRIP: ABNORMAL
HYALINE CASTS #/AREA URNS LPF: ABNORMAL /LPF
KETONES UR QL STRIP: NEGATIVE
LEUKOCYTE ESTERASE UR QL STRIP: NEGATIVE
MCH RBC QN AUTO: 29.5 PG (ref 27–33)
MCHC RBC AUTO-ENTMCNC: 32.5 G/DL (ref 32–36)
MCV RBC AUTO: 90.8 FL (ref 80–100)
MICROALBUMIN UR-MCNC: 3 MG/DL
NITRITE UR QL STRIP: NEGATIVE
PH UR STRIP: 5.5 [PH] (ref 5–8)
PHOSPHATE SERPL-MCNC: 3.9 MG/DL (ref 2.1–4.3)
PLATELET # BLD AUTO: 344 THOUSAND/UL (ref 140–400)
PMV BLD REES-ECKER: 10.8 FL (ref 7.5–12.5)
POTASSIUM SERPL-SCNC: 5.6 MMOL/L (ref 3.5–5.3)
PROT UR QL STRIP: NEGATIVE
PROT UR-MCNC: 9 MG/DL (ref 5–24)
PROT/CREAT UR: 0.35 MG/MG CREAT (ref 0.02–0.18)
PROT/CREAT UR: 346 MG/G CREAT (ref 24–184)
RBC # BLD AUTO: 4.24 MILLION/UL (ref 3.8–5.1)
RBC #/AREA URNS HPF: ABNORMAL /HPF
SERVICE CMNT-IMP: ABNORMAL
SODIUM SERPL-SCNC: 137 MMOL/L (ref 135–146)
SP GR UR STRIP: 1.01 (ref 1–1.03)
SQUAMOUS #/AREA URNS HPF: ABNORMAL /HPF
WBC # BLD AUTO: 8.8 THOUSAND/UL (ref 3.8–10.8)
WBC #/AREA URNS HPF: ABNORMAL /HPF

## 2025-06-30 ENCOUNTER — APPOINTMENT (OUTPATIENT)
Dept: BEHAVIORAL HEALTH | Facility: CLINIC | Age: 70
End: 2025-06-30
Payer: MEDICARE

## 2025-07-01 DIAGNOSIS — F33.41 RECURRENT MAJOR DEPRESSIVE DISORDER, IN PARTIAL REMISSION: ICD-10-CM

## 2025-07-01 DIAGNOSIS — F41.9 ANXIETY: ICD-10-CM

## 2025-07-01 DIAGNOSIS — F90.0 ATTENTION DEFICIT HYPERACTIVITY DISORDER (ADHD), PREDOMINANTLY INATTENTIVE TYPE: ICD-10-CM

## 2025-07-02 RX ORDER — BUPROPION HYDROCHLORIDE 100 MG/1
100 TABLET, EXTENDED RELEASE ORAL DAILY
Qty: 30 TABLET | Refills: 0 | Status: SHIPPED | OUTPATIENT
Start: 2025-07-02 | End: 2025-08-01

## 2025-07-02 RX ORDER — PAROXETINE 30 MG/1
15 TABLET, FILM COATED ORAL DAILY
Qty: 45 TABLET | Refills: 0 | Status: SHIPPED | OUTPATIENT
Start: 2025-07-02 | End: 2025-09-30

## 2025-07-29 ENCOUNTER — APPOINTMENT (OUTPATIENT)
Dept: PRIMARY CARE | Facility: CLINIC | Age: 70
End: 2025-07-29
Payer: MEDICARE

## 2025-07-29 VITALS
OXYGEN SATURATION: 93 % | HEART RATE: 84 BPM | BODY MASS INDEX: 33.15 KG/M2 | SYSTOLIC BLOOD PRESSURE: 127 MMHG | HEIGHT: 61 IN | WEIGHT: 175.6 LBS | DIASTOLIC BLOOD PRESSURE: 72 MMHG

## 2025-07-29 DIAGNOSIS — F90.0 ATTENTION DEFICIT HYPERACTIVITY DISORDER (ADHD), PREDOMINANTLY INATTENTIVE TYPE: ICD-10-CM

## 2025-07-29 DIAGNOSIS — R06.02 SHORTNESS OF BREATH ON EXERTION: ICD-10-CM

## 2025-07-29 DIAGNOSIS — E78.2 MIXED HYPERLIPIDEMIA: ICD-10-CM

## 2025-07-29 DIAGNOSIS — N18.32 STAGE 3B CHRONIC KIDNEY DISEASE (MULTI): ICD-10-CM

## 2025-07-29 DIAGNOSIS — F31.75 BIPOLAR DISORDER, IN PARTIAL REMISSION, MOST RECENT EPISODE DEPRESSED (MULTI): ICD-10-CM

## 2025-07-29 DIAGNOSIS — K21.9 GASTROESOPHAGEAL REFLUX DISEASE WITHOUT ESOPHAGITIS: ICD-10-CM

## 2025-07-29 DIAGNOSIS — H60.543 ECZEMATOID OTITIS EXTERNA OF BOTH EARS, UNSPECIFIED CHRONICITY: ICD-10-CM

## 2025-07-29 DIAGNOSIS — Z12.31 ENCOUNTER FOR SCREENING MAMMOGRAM FOR MALIGNANT NEOPLASM OF BREAST: ICD-10-CM

## 2025-07-29 DIAGNOSIS — I10 BENIGN ESSENTIAL HYPERTENSION: ICD-10-CM

## 2025-07-29 DIAGNOSIS — R01.1 CARDIAC MURMUR: ICD-10-CM

## 2025-07-29 DIAGNOSIS — F41.9 ANXIETY: ICD-10-CM

## 2025-07-29 DIAGNOSIS — F31.70 BIPOLAR AFFECTIVE DISORDER IN REMISSION (MULTI): ICD-10-CM

## 2025-07-29 DIAGNOSIS — H60.313 ACUTE DIFFUSE OTITIS EXTERNA OF BOTH EARS: ICD-10-CM

## 2025-07-29 DIAGNOSIS — Z00.00 MEDICARE ANNUAL WELLNESS VISIT, SUBSEQUENT: Primary | ICD-10-CM

## 2025-07-29 LAB
DLCO: 0 ML/MMHG SEC
ERV: 0 L
FEV1/FVC: 86 %
FEV1: 2.74 LITERS
FRC-PL: 0 L
FVC: 3.18 LITERS
MVV: 0 L/MIN
PEF: 5.85 L/S
RV/TLC: 0 %
RV: 0 L
TLC: 0 LITERS

## 2025-07-29 PROCEDURE — G0444 DEPRESSION SCREEN ANNUAL: HCPCS | Performed by: FAMILY MEDICINE

## 2025-07-29 PROCEDURE — 3074F SYST BP LT 130 MM HG: CPT | Performed by: FAMILY MEDICINE

## 2025-07-29 PROCEDURE — 1036F TOBACCO NON-USER: CPT | Performed by: FAMILY MEDICINE

## 2025-07-29 PROCEDURE — 99214 OFFICE O/P EST MOD 30 MIN: CPT | Performed by: FAMILY MEDICINE

## 2025-07-29 PROCEDURE — 93000 ELECTROCARDIOGRAM COMPLETE: CPT | Performed by: FAMILY MEDICINE

## 2025-07-29 PROCEDURE — 1158F ADVNC CARE PLAN TLK DOCD: CPT | Performed by: FAMILY MEDICINE

## 2025-07-29 PROCEDURE — G0439 PPPS, SUBSEQ VISIT: HCPCS | Performed by: FAMILY MEDICINE

## 2025-07-29 PROCEDURE — 1170F FXNL STATUS ASSESSED: CPT | Performed by: FAMILY MEDICINE

## 2025-07-29 PROCEDURE — 3008F BODY MASS INDEX DOCD: CPT | Performed by: FAMILY MEDICINE

## 2025-07-29 PROCEDURE — 1160F RVW MEDS BY RX/DR IN RCRD: CPT | Performed by: FAMILY MEDICINE

## 2025-07-29 PROCEDURE — 3078F DIAST BP <80 MM HG: CPT | Performed by: FAMILY MEDICINE

## 2025-07-29 PROCEDURE — 1159F MED LIST DOCD IN RCRD: CPT | Performed by: FAMILY MEDICINE

## 2025-07-29 RX ORDER — FAMOTIDINE 40 MG/1
40 TABLET, FILM COATED ORAL 2 TIMES DAILY
Qty: 180 TABLET | Refills: 1 | Status: SHIPPED | OUTPATIENT
Start: 2025-07-29 | End: 2025-07-29 | Stop reason: SDUPTHER

## 2025-07-29 RX ORDER — LAMOTRIGINE 150 MG/1
300 TABLET ORAL DAILY
Qty: 180 TABLET | Refills: 0 | Status: SHIPPED | OUTPATIENT
Start: 2025-07-29

## 2025-07-29 RX ORDER — PAROXETINE 30 MG/1
30 TABLET, FILM COATED ORAL DAILY
Qty: 90 TABLET | Refills: 0 | Status: SHIPPED | OUTPATIENT
Start: 2025-07-29 | End: 2025-10-27

## 2025-07-29 RX ORDER — NEOMYCIN SULFATE, POLYMYXIN B SULFATE, HYDROCORTISONE 3.5; 10000; 1 MG/ML; [USP'U]/ML; MG/ML
2 SOLUTION/ DROPS AURICULAR (OTIC) 3 TIMES DAILY
Qty: 10 ML | Refills: 0 | Status: SHIPPED | OUTPATIENT
Start: 2025-07-29

## 2025-07-29 RX ORDER — HYDROCHLOROTHIAZIDE 25 MG/1
25 TABLET ORAL DAILY
Qty: 90 TABLET | Refills: 1 | Status: SHIPPED | OUTPATIENT
Start: 2025-07-29

## 2025-07-29 RX ORDER — VALSARTAN 80 MG/1
80 TABLET ORAL DAILY
Qty: 90 TABLET | Refills: 0 | Status: SHIPPED | OUTPATIENT
Start: 2025-07-29

## 2025-07-29 RX ORDER — FAMOTIDINE 40 MG/1
40 TABLET, FILM COATED ORAL 2 TIMES DAILY
Qty: 180 TABLET | Refills: 1 | Status: SHIPPED | OUTPATIENT
Start: 2025-07-29

## 2025-07-29 RX ORDER — BUPROPION HYDROCHLORIDE 150 MG/1
150 TABLET, EXTENDED RELEASE ORAL
Qty: 90 TABLET | Refills: 0 | Status: SHIPPED | OUTPATIENT
Start: 2025-07-29

## 2025-07-29 ASSESSMENT — ACTIVITIES OF DAILY LIVING (ADL)
BATHING: INDEPENDENT
DOING_HOUSEWORK: INDEPENDENT
MANAGING_FINANCES: INDEPENDENT
DRESSING: INDEPENDENT
TAKING_MEDICATION: INDEPENDENT
GROCERY_SHOPPING: INDEPENDENT

## 2025-07-29 ASSESSMENT — ANXIETY QUESTIONNAIRES
4. TROUBLE RELAXING: SEVERAL DAYS
6. BECOMING EASILY ANNOYED OR IRRITABLE: NEARLY EVERY DAY
2. NOT BEING ABLE TO STOP OR CONTROL WORRYING: MORE THAN HALF THE DAYS
GAD7 TOTAL SCORE: 12
5. BEING SO RESTLESS THAT IT IS HARD TO SIT STILL: SEVERAL DAYS
IF YOU CHECKED OFF ANY PROBLEMS ON THIS QUESTIONNAIRE, HOW DIFFICULT HAVE THESE PROBLEMS MADE IT FOR YOU TO DO YOUR WORK, TAKE CARE OF THINGS AT HOME, OR GET ALONG WITH OTHER PEOPLE: SOMEWHAT DIFFICULT
3. WORRYING TOO MUCH ABOUT DIFFERENT THINGS: MORE THAN HALF THE DAYS
7. FEELING AFRAID AS IF SOMETHING AWFUL MIGHT HAPPEN: NOT AT ALL
1. FEELING NERVOUS, ANXIOUS, OR ON EDGE: NEARLY EVERY DAY

## 2025-07-29 ASSESSMENT — PATIENT HEALTH QUESTIONNAIRE - PHQ9
2. FEELING DOWN, DEPRESSED OR HOPELESS: NEARLY EVERY DAY
7. TROUBLE CONCENTRATING ON THINGS, SUCH AS READING THE NEWSPAPER OR WATCHING TELEVISION: SEVERAL DAYS
9. THOUGHTS THAT YOU WOULD BE BETTER OFF DEAD, OR OF HURTING YOURSELF: NOT AT ALL
5. POOR APPETITE OR OVEREATING: NOT AT ALL
8. MOVING OR SPEAKING SO SLOWLY THAT OTHER PEOPLE COULD HAVE NOTICED. OR THE OPPOSITE, BEING SO FIGETY OR RESTLESS THAT YOU HAVE BEEN MOVING AROUND A LOT MORE THAN USUAL: NOT AT ALL
3. TROUBLE FALLING OR STAYING ASLEEP OR SLEEPING TOO MUCH: MORE THAN HALF THE DAYS
6. FEELING BAD ABOUT YOURSELF - OR THAT YOU ARE A FAILURE OR HAVE LET YOURSELF OR YOUR FAMILY DOWN: NOT AT ALL
4. FEELING TIRED OR HAVING LITTLE ENERGY: SEVERAL DAYS

## 2025-07-29 ASSESSMENT — ENCOUNTER SYMPTOMS
LOSS OF SENSATION IN FEET: 0
OCCASIONAL FEELINGS OF UNSTEADINESS: 0

## 2025-07-29 NOTE — ASSESSMENT & PLAN NOTE
- An echocardiogram will be scheduled to further evaluate the heart murmur.  - Recent episode of shortness of breath noted, both with exertion and at rest.  Orders:    Transthoracic Echo (TTE) Complete; Future    perflutren lipid microspheres (Definity) injection 0.5-10 mL of dilution    sulfur hexafluoride microsphr (Lumason) injection 24.28 mg    perflutren protein A microsphere (Optison) injection 0.5 mL    Insert and maintain peripheral IV; Standing

## 2025-07-29 NOTE — PROGRESS NOTES
Subjective   Past Medical, Surgical, and Family History reviewed and updated in chart.    Reviewed all medications by prescribing practitioner or clinical pharmacist (such as prescriptions, OTCs, herbal therapies and supplements) and documented in the medical record.    HPI  History of Present Illness  Elsy Molina is a 70-year-old female who presents for a Medicare wellness visit and concerns regarding heart murmur, shortness of breath, cough, anxiety, depression, blood pressure management, acid reflux, hyperlipidemia, ear rash, and health maintenance.    Heart Murmur and Shortness of Breath  She was previously diagnosed with a heart murmur and was advised to undergo an echocardiogram, which has not yet been scheduled. She experienced an episode of severe shortness of breath while pushing patients at work, which also occurred at home without exertion. This episode lasted approximately an hour. An EKG performed 2 months ago due to her shortness of breath was normal. She occasionally experiences difficulty taking deep breaths before bedtime. She has not undergone a pulmonary function test. She has a history of seasonal allergies but has not experienced them in recent years. She does not smoke.  - Onset: Severe shortness of breath episode while pushing patients at work and at home without exertion.  - Duration: Episode lasted approximately an hour.  - Character: Severe shortness of breath, difficulty taking deep breaths before bedtime.  - Timing: Occasionally experiences difficulty taking deep breaths before bedtime.  - Severity: Severe episode of shortness of breath; EKG normal.    COUGH  She has been experiencing a persistent cough for the past 2 years, which she initially attributed to sinus issues. However, she now suspects it may be a side effect of lisinopril.  - Onset: Persistent for the past 2 years.  - Character: Initially attributed to sinus issues, now suspected as a side effect of lisinopril.    ANXIETY &  DEPRESSION  She has been off Paxil for about 6 weeks and is considering increasing the dosage due to persistent anxiety at work. She continues to take Wellbutrin and Lamictal 150 mg twice daily. She is currently on half a tablet of Paxil.  - Onset: Persistent anxiety at work.  - Duration: Off Paxil for about 6 weeks.  - Character: Persistent anxiety.  - Alleviating/Aggravating Factors: Considering increasing Paxil dosage.  - Timing: Currently on half a tablet of Paxil.    GERD  She is currently taking pantoprazole for stomach issues and is seeking an alternative medication due to potential kidney damage. She reports no muscle symptoms but notes a lack of muscle tone, which she attributes to her lack of exercise. Her bowel movements are regular. She has tried famotidine without success.  - Onset: Persistent stomach issues.  - Character: No muscle symptoms, lack of muscle tone.  - Alleviating/Aggravating Factors: Seeking alternative medication due to potential kidney damage; tried famotidine without success.    Hyperlipidemia  She is currently taking atorvastatin for cholesterol management and has refills available.  - Alleviating/Aggravating Factors: Taking atorvastatin for cholesterol management.    RASH - EAR ITCHING  She has noticed a rash in her ear canal, which feels rough but is not itchy. The rash appeared before she started swimming and she does not use ear plugs while swimming.  - Onset: Rash appeared before she started swimming.  - Location: Ear canal.  - Character: Feels rough but is not itchy.    Health Maintenance  She is due for a tetanus vaccine in August 2025. She has received 2 doses of the shingles vaccine. She had a fall in September 2024, resulting in a large bump on her head, but reports no recent headaches or dizziness. She is not scheduled for a mammogram.    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     Appearance: Normal appearance.   HENT:      Head: Normocephalic and atraumatic.      Right Ear: Tympanic membrane, ear canal and external ear normal.      Left Ear: Tympanic membrane, ear canal and external ear normal.      Nose: Nose normal.      Mouth/Throat:      Mouth: Mucous membranes are moist.      Pharynx: No oropharyngeal exudate or posterior oropharyngeal erythema.     Eyes:      Extraocular Movements: Extraocular movements intact.      Conjunctiva/sclera: Conjunctivae normal.      Pupils: Pupils are equal, round, and reactive to light.       Cardiovascular:      Rate and Rhythm: Normal rate and regular rhythm.      Heart sounds: No murmur heard.  Pulmonary:      Effort: Pulmonary effort is normal.      Breath sounds: Normal breath sounds.   Abdominal:      General: Bowel sounds are normal. There is no distension.      Palpations: Abdomen is soft.      Comments: Normal Bowel Sounds     Musculoskeletal:         General: Normal range of motion.      Cervical back: No rigidity.   Lymphadenopathy:      Cervical: No cervical adenopathy.     Skin:     General: Skin is warm and dry.      Findings: No rash.     Neurological:      General: No focal deficit present.      Mental Status: She is alert and oriented to person, place, and time.      Cranial Nerves: No cranial nerve deficit.      Gait: Gait normal.     Psychiatric:         Mood and Affect: Mood normal.         Behavior: Behavior normal.         Assessment & Plan  Medicare annual wellness visit, subsequent    Orders:    1 Year Follow Up [facility] - PCP - Wellness Exam    1 Year Follow Up [facility] - PCP - Wellness Exam; " Future    Stage 3b chronic kidney disease (Multi)  Chronic condition documentation: stable based on last GFR.   Continue established treatment plan including avoidance of nephrotoxins including NSAIDs, and limiting red meat intake to 1-3 times per month.  Avoid contrast if possible. If to be done- we recommend holding ACEi/ARBS/diuretics 24 hrs prior to contrast exposure and ensure appropriate hydration   Avoid tobacco use, limit salt intake and continue with adequate hydration.  Follow-up at least yearly.        Anxiety  Bipolar disorder, in partial remission, most recent episode depressed (Multi)  Bipolar affective disorder in remission (Multi)  Attention deficit hyperactivity disorder (ADHD), predominantly inattentive type  - Paxil 30 mg, Wellbutrin 150 mg twice a day, and Lamictal 100 mg once a day will be resumed.  - A note will be sent to Mechelle,her psychiatry specialist, informing her of the medication adjustments.  Orders:    PARoxetine (Paxil) 30 mg tablet; Take 1 tablet (30 mg) by mouth once daily.    buPROPion SR (Wellbutrin SR) 150 mg 12 hr tablet; Take 1 tablet (150 mg) by mouth once daily with breakfast. Do not crush, chew, or split.    lamoTRIgine (LaMICtal) 150 mg tablet; Take 2 tablets (300 mg) by mouth once daily.    Benign essential hypertension  Cough - possibly secondary to ACE (lisinopril)  - Lisinopril will be replaced with valsartan to manage blood pressure and potentially alleviate the cough.  - Current diuretic regimen will be continued.  Orders:    valsartan (Diovan) 80 mg tablet; Take 1 tablet (80 mg) by mouth once daily.    hydroCHLOROthiazide (HYDRODiuril) 25 mg tablet; Take 1 tablet (25 mg) by mouth once daily.    Gastroesophageal reflux disease without esophagitis  - Dosage of famotidine will be increased to 40 mg twice daily.  - Current pantoprazole regimen will be continued.  Orders:    famotidine (Pepcid) 40 mg tablet; Take 1 tablet (40 mg) by mouth 2 times a day.    Acute diffuse  otitis externa of both ears    Orders:    neomycin-polymyxin-HC (Cortisporin) otic solution; Administer 2 drops into each ear 3 times a day.    Cardiac murmur  - An echocardiogram will be scheduled to further evaluate the heart murmur.  - Recent episode of shortness of breath noted, both with exertion and at rest.  Orders:    Transthoracic Echo (TTE) Complete; Future    perflutren lipid microspheres (Definity) injection 0.5-10 mL of dilution    sulfur hexafluoride microsphr (Lumason) injection 24.28 mg    perflutren protein A microsphere (Optison) injection 0.5 mL    Insert and maintain peripheral IV; Standing    Shortness of breath on exertion  - A pulmonary function test will be conducted to assess respiratory function.  - An EKG will be performed to establish a baseline before the echocardiogram.  - Previous EKG conducted 2 months ago was normal.  Orders:    ECG 12 lead (Clinic Performed)    Breathing capacity test    Encounter for screening mammogram for malignant neoplasm of breast    Orders:    BI mammo bilateral screening tomosynthesis; Future    Mixed hyperlipidemia  - A fasting cholesterol test will be ordered.  - Current atorvastatin regimen will be continued until results are available.       Eczematoid otitis externa of both ears, unspecified chronicity  - Use of swimmer's drops recommended to prevent future occurrences.  - Steroid drops will be prescribed for the ear rash.          Assessment & Plan  Health maintenance.  - Tetanus vaccine is due in 08/2025.  - Hepatitis C screening will be conducted during the physical examination.  - A mammogram will be scheduled.    Follow-up  - An echocardiogram will be scheduled.  - A pulmonary function test will be conducted.  - An EKG will be performed.    Follow-up  3-6 months pending review of above.  BP check in 2-4 weeks.    Depression Screening  5 - 10 minutes were spent screening for depression.     Jennifer Gama, DO   This medical note was created with the  assistance of artificial intelligence (AI) for documentation purposes. The content has been reviewed and confirmed by the healthcare provider for accuracy and completeness. Patient consented to the use of audio recording and use of AI during their visit.

## 2025-07-29 NOTE — ASSESSMENT & PLAN NOTE
Cough - possibly secondary to ACE (lisinopril)  - Lisinopril will be replaced with valsartan to manage blood pressure and potentially alleviate the cough.  - Current diuretic regimen will be continued.  Orders:    valsartan (Diovan) 80 mg tablet; Take 1 tablet (80 mg) by mouth once daily.    hydroCHLOROthiazide (HYDRODiuril) 25 mg tablet; Take 1 tablet (25 mg) by mouth once daily.

## 2025-07-29 NOTE — ASSESSMENT & PLAN NOTE
Orders:    1 Year Follow Up In [facility] - PCP - Wellness Exam    1 Year Follow Up In [facility] - PCP - Wellness Exam; Future

## 2025-07-29 NOTE — ASSESSMENT & PLAN NOTE
Chronic condition documentation: stable based on last GFR.   Continue established treatment plan including avoidance of nephrotoxins including NSAIDs, and limiting red meat intake to 1-3 times per month.  Avoid contrast if possible. If to be done- we recommend holding ACEi/ARBS/diuretics 24 hrs prior to contrast exposure and ensure appropriate hydration   Avoid tobacco use, limit salt intake and continue with adequate hydration.  Follow-up at least yearly.

## 2025-07-29 NOTE — ASSESSMENT & PLAN NOTE
- Dosage of famotidine will be increased to 40 mg twice daily.  - Current pantoprazole regimen will be continued.  Orders:    famotidine (Pepcid) 40 mg tablet; Take 1 tablet (40 mg) by mouth 2 times a day.

## 2025-07-29 NOTE — ASSESSMENT & PLAN NOTE
- Paxil 30 mg, Wellbutrin 150 mg twice a day, and Lamictal 100 mg once a day will be resumed.  - A note will be sent to Mechelle,her psychiatry specialist, informing her of the medication adjustments.  Orders:    PARoxetine (Paxil) 30 mg tablet; Take 1 tablet (30 mg) by mouth once daily.    buPROPion SR (Wellbutrin SR) 150 mg 12 hr tablet; Take 1 tablet (150 mg) by mouth once daily with breakfast. Do not crush, chew, or split.    lamoTRIgine (LaMICtal) 150 mg tablet; Take 2 tablets (300 mg) by mouth once daily.

## 2025-08-05 NOTE — ASSESSMENT & PLAN NOTE
- A fasting cholesterol test will be ordered.  - Current atorvastatin regimen will be continued until results are available.

## 2025-08-19 ENCOUNTER — APPOINTMENT (OUTPATIENT)
Dept: RADIOLOGY | Facility: CLINIC | Age: 70
End: 2025-08-19
Payer: MEDICARE

## 2025-08-25 ENCOUNTER — APPOINTMENT (OUTPATIENT)
Dept: RADIOLOGY | Facility: CLINIC | Age: 70
End: 2025-08-25
Payer: MEDICARE

## 2025-08-26 ENCOUNTER — APPOINTMENT (OUTPATIENT)
Dept: NEPHROLOGY | Facility: CLINIC | Age: 70
End: 2025-08-26
Payer: MEDICARE

## 2025-08-26 VITALS
SYSTOLIC BLOOD PRESSURE: 114 MMHG | OXYGEN SATURATION: 94 % | WEIGHT: 169 LBS | DIASTOLIC BLOOD PRESSURE: 70 MMHG | HEART RATE: 78 BPM | BODY MASS INDEX: 31.93 KG/M2

## 2025-08-26 DIAGNOSIS — N18.32 STAGE 3B CHRONIC KIDNEY DISEASE (CKD) (MULTI): ICD-10-CM

## 2025-08-26 DIAGNOSIS — I10 BENIGN ESSENTIAL HYPERTENSION: ICD-10-CM

## 2025-08-26 PROCEDURE — 99213 OFFICE O/P EST LOW 20 MIN: CPT | Performed by: INTERNAL MEDICINE

## 2025-08-26 PROCEDURE — 1159F MED LIST DOCD IN RCRD: CPT | Performed by: INTERNAL MEDICINE

## 2025-08-26 PROCEDURE — 1160F RVW MEDS BY RX/DR IN RCRD: CPT | Performed by: INTERNAL MEDICINE

## 2025-08-26 PROCEDURE — 3074F SYST BP LT 130 MM HG: CPT | Performed by: INTERNAL MEDICINE

## 2025-08-26 PROCEDURE — 3078F DIAST BP <80 MM HG: CPT | Performed by: INTERNAL MEDICINE

## 2025-08-26 RX ORDER — VALSARTAN 160 MG/1
160 TABLET ORAL DAILY
Qty: 90 TABLET | Refills: 1 | Status: SHIPPED | OUTPATIENT
Start: 2025-08-26

## 2025-09-22 ENCOUNTER — APPOINTMENT (OUTPATIENT)
Dept: BEHAVIORAL HEALTH | Facility: CLINIC | Age: 70
End: 2025-09-22
Payer: MEDICARE

## 2025-12-01 ENCOUNTER — APPOINTMENT (OUTPATIENT)
Dept: PRIMARY CARE | Facility: CLINIC | Age: 70
End: 2025-12-01
Payer: MEDICARE

## 2026-01-27 ENCOUNTER — APPOINTMENT (OUTPATIENT)
Dept: PRIMARY CARE | Facility: CLINIC | Age: 71
End: 2026-01-27
Payer: MEDICARE

## 2026-03-03 ENCOUNTER — APPOINTMENT (OUTPATIENT)
Dept: NEPHROLOGY | Facility: CLINIC | Age: 71
End: 2026-03-03
Payer: MEDICARE